# Patient Record
Sex: MALE | Race: WHITE | NOT HISPANIC OR LATINO | Employment: FULL TIME | URBAN - METROPOLITAN AREA
[De-identification: names, ages, dates, MRNs, and addresses within clinical notes are randomized per-mention and may not be internally consistent; named-entity substitution may affect disease eponyms.]

---

## 2021-06-08 ENCOUNTER — OFFICE VISIT (OUTPATIENT)
Dept: SURGERY | Facility: CLINIC | Age: 31
End: 2021-06-08

## 2021-06-08 VITALS
BODY MASS INDEX: 28.55 KG/M2 | SYSTOLIC BLOOD PRESSURE: 116 MMHG | WEIGHT: 181.9 LBS | TEMPERATURE: 98 F | OXYGEN SATURATION: 98 % | HEART RATE: 86 BPM | RESPIRATION RATE: 18 BRPM | DIASTOLIC BLOOD PRESSURE: 74 MMHG | HEIGHT: 67 IN

## 2021-06-08 DIAGNOSIS — R10.13 EPIGASTRIC PAIN: Primary | ICD-10-CM

## 2021-06-08 PROCEDURE — 99244 OFF/OP CNSLTJ NEW/EST MOD 40: CPT | Performed by: SURGERY

## 2021-06-08 RX ORDER — ESOMEPRAZOLE MAGNESIUM 40 MG/1
CAPSULE, DELAYED RELEASE ORAL
Status: ON HOLD | COMMUNITY
Start: 2021-05-23 | End: 2021-06-18

## 2021-06-08 RX ORDER — CLARITHROMYCIN 500 MG/1
TABLET, COATED ORAL
COMMUNITY
Start: 2021-05-29 | End: 2021-06-14

## 2021-06-08 RX ORDER — AMOXICILLIN 500 MG/1
CAPSULE ORAL
COMMUNITY
Start: 2021-05-29 | End: 2021-06-14

## 2021-06-08 RX ORDER — MELOXICAM 15 MG/1
15 TABLET ORAL
COMMUNITY
Start: 2021-02-17 | End: 2021-06-14

## 2021-06-08 NOTE — PROGRESS NOTES
CC:  Epigastric pain     Patient is a 31 y.o. male referred by Arnie Gutierrez MD for evaluation of epigastric abdominal pain.  Patient reports in the epigastric area of the abdomen he has having a severe burning sensation and gnawing that does not radiate.  Patient had a previous history in 2011 and hospitalization for bleeding ulcers in his stomach and esophagus.  Patient has been having some dark stools but no diarrhea.  Patient has been having bloating after eating fatty foods and milk products.  Patient has tried to eliminate those foods but has not seen any difference in his discomfort.  Patient was placed on Nexium and treated for H. pylori Ag approximately 1 month ago and has not had any relief.  He is started on align and a prebiotic.  His last EGD was in 2011 when he was hospitalized for bleeding ulcers.  Patient denies hematemesis, fevers, chills, nausea or vomiting.  Patient has not had any unexplained weight loss.    Past Medical History:   Diagnosis Date   • Ulcer of abdomen wall (CMS/HCC)    • Ulcer of esophagus      Past Surgical History:   Procedure Laterality Date   • ADENOIDECTOMY     • TONSILLECTOMY     • WISDOM TOOTH EXTRACTION       Family History   Problem Relation Age of Onset   • Melanoma Father    • No Known Problems Brother    • No Known Problems Daughter    • Asthma Son    • Heart disease Maternal Grandmother    • Hypertension Maternal Grandmother    • Breast cancer Paternal Grandmother      Social History     Tobacco Use   • Smoking status: Never Smoker   • Smokeless tobacco: Never Used   Substance Use Topics   • Alcohol use: Yes     Alcohol/week: 7.0 standard drinks     Types: 7 Cans of beer per week   • Drug use: Not Currently     No Known Allergies    Current Outpatient Medications:   •  amoxicillin (AMOXIL) 500 MG capsule, TAKE 1 CAPSULE BY MOUTH TWICE DAILY FOR 14 DAYS OR UNTIL DIRECTED TO STOP, Disp: , Rfl:   •  clarithromycin (BIAXIN) 500 MG tablet, TAKE 1 TABLET BY MOUTH TWICE  "DAILY FOR 14 DAYS OR UNTIL DIRECTED TO STOP, Disp: , Rfl:   •  esomeprazole (nexIUM) 40 MG capsule, , Disp: , Rfl:   •  meloxicam (MOBIC) 15 MG tablet, Take 15 mg by mouth., Disp: , Rfl:   •  NON FORMULARY, ALIGN DUALBIOTIC PREBIOTIC + PROBIOTIC, Disp: , Rfl:     Review of Systems  General: Patient reports during good health  Eyes: No eye problems  Ears, nose, mouth and throat: No rhinitis, no hearing problems, no chronic cough  Cardiovascular/heart: Denies palpitations, syncope or chest pain  Respiratory/lung: Denies shortness of breath, hemoptysis, dyspnea on exertion   Genital/urinary: No frequency, hematuria or dysuria  Hematological/lymphatic: Denies anemia or other problems  Musculoskeletal: No joint pain, no defects  Skin: No psoriasis or other skin issues  Neurological: No seizures or other neurological problems  Psychiatric: None  Endocrine: Negative  Gastro-intestinal: No constipation, no diarrhea, no melena, no hematochezia, see HPI  Vitals:    06/08/21 0820   BP: 116/74   BP Location: Left arm   Patient Position: Sitting   Cuff Size: Adult   Pulse: 86   Resp: 18   Temp: 98 °F (36.7 °C)   TempSrc: Temporal   SpO2: 98%   Weight: 82.5 kg (181 lb 14.4 oz)   Height: 170.2 cm (67\")       Physical Exam  General/physcological:   Alert and oriented x3, in no acute distress  HEENT: Normal cephalic, atraumatic, PERRLA, EOMI, sclera anicteric, moist mucous membranes, neck is supple, no JVD, no carotid bruits, no thyromegaly no adenopathy  Respiratory: CTA and percussion  CVA: RRR, normal S1-S2, no murmurs, no gallops or rubs  GI: Positive BS, soft, nondistended, nontender, no rebound, no guarding, no hernias, no organomegaly and no masses  Musculoskeletal: Moves all 4 ext, no clubbing, no cyanosis or edema  Neurovascular: Grossly intact  Debilities: none  Emotional behavior: appropriate     Patient does not use tobacco products currently.     Assessment:  Epigastric abdominal pain  Personal history of gastric " ulcers  Currently being treated for H pylori    Plan:  I have recommended that the patient undergo further evaluation with an EGD and biopsies.  I have discussed this procedure in detail with the patient.  I have discussed the risks, benefits and alternatives.  I have discussed the risk of anesthesia, bleeding and perforation.  Patient understands these risks, benefits and alternatives and wishes to proceed.  I have him scheduled at his earliest convenience.    Brittanie Sequeira MD  General, Minimally Invasive and Endoscopic Surgery  [facility]      [address] [address]    [address]  [address] [address]    P: [phone]  F: [phone]    Cc:  Arnie Gutierrez MD

## 2021-06-09 ENCOUNTER — TRANSCRIBE ORDERS (OUTPATIENT)
Dept: LAB | Facility: SURGERY CENTER | Age: 31
End: 2021-06-09

## 2021-06-09 DIAGNOSIS — Z01.818 OTHER SPECIFIED PRE-OPERATIVE EXAMINATION: Primary | ICD-10-CM

## 2021-06-09 PROBLEM — R10.13 EPIGASTRIC PAIN: Status: ACTIVE | Noted: 2021-06-09

## 2021-06-16 ENCOUNTER — LAB (OUTPATIENT)
Dept: LAB | Facility: SURGERY CENTER | Age: 31
End: 2021-06-16

## 2021-06-16 DIAGNOSIS — Z01.818 OTHER SPECIFIED PRE-OPERATIVE EXAMINATION: ICD-10-CM

## 2021-06-16 LAB — SARS-COV-2 ORF1AB RESP QL NAA+PROBE: NOT DETECTED

## 2021-06-16 PROCEDURE — C9803 HOPD COVID-19 SPEC COLLECT: HCPCS

## 2021-06-16 PROCEDURE — U0004 COV-19 TEST NON-CDC HGH THRU: HCPCS | Performed by: SURGERY

## 2021-06-18 ENCOUNTER — ANESTHESIA EVENT (OUTPATIENT)
Dept: SURGERY | Facility: SURGERY CENTER | Age: 31
End: 2021-06-18

## 2021-06-18 ENCOUNTER — HOSPITAL ENCOUNTER (OUTPATIENT)
Facility: SURGERY CENTER | Age: 31
Setting detail: HOSPITAL OUTPATIENT SURGERY
Discharge: HOME | End: 2021-06-18
Attending: SURGERY | Admitting: SURGERY

## 2021-06-18 ENCOUNTER — ANESTHESIA (OUTPATIENT)
Dept: SURGERY | Facility: SURGERY CENTER | Age: 31
End: 2021-06-18

## 2021-06-18 VITALS
SYSTOLIC BLOOD PRESSURE: 112 MMHG | OXYGEN SATURATION: 99 % | HEART RATE: 62 BPM | DIASTOLIC BLOOD PRESSURE: 75 MMHG | TEMPERATURE: 98 F | RESPIRATION RATE: 15 BRPM | BODY MASS INDEX: 27.1 KG/M2 | WEIGHT: 173 LBS

## 2021-06-18 DIAGNOSIS — R10.13 EPIGASTRIC PAIN: ICD-10-CM

## 2021-06-18 PROCEDURE — 88342 IMHCHEM/IMCYTCHM 1ST ANTB: CPT | Performed by: SURGERY

## 2021-06-18 PROCEDURE — 43239 EGD BIOPSY SINGLE/MULTIPLE: CPT | Performed by: SURGERY

## 2021-06-18 PROCEDURE — 25010000002 PROPOFOL 10 MG/ML EMULSION: Performed by: ANESTHESIOLOGY

## 2021-06-18 PROCEDURE — 25010000002 FENTANYL CITRATE (PF) 50 MCG/ML SOLUTION: Performed by: ANESTHESIOLOGY

## 2021-06-18 PROCEDURE — 88305 TISSUE EXAM BY PATHOLOGIST: CPT | Performed by: SURGERY

## 2021-06-18 RX ORDER — LIDOCAINE HYDROCHLORIDE 10 MG/ML
0.5 INJECTION, SOLUTION INFILTRATION; PERINEURAL ONCE AS NEEDED
Status: DISCONTINUED | OUTPATIENT
Start: 2021-06-18 | End: 2021-06-18 | Stop reason: HOSPADM

## 2021-06-18 RX ORDER — FENTANYL CITRATE 50 UG/ML
INJECTION, SOLUTION INTRAMUSCULAR; INTRAVENOUS AS NEEDED
Status: DISCONTINUED | OUTPATIENT
Start: 2021-06-18 | End: 2021-06-18 | Stop reason: SURG

## 2021-06-18 RX ORDER — LIDOCAINE HYDROCHLORIDE 20 MG/ML
INJECTION, SOLUTION INFILTRATION; PERINEURAL AS NEEDED
Status: DISCONTINUED | OUTPATIENT
Start: 2021-06-18 | End: 2021-06-18 | Stop reason: SURG

## 2021-06-18 RX ORDER — SODIUM CHLORIDE, SODIUM LACTATE, POTASSIUM CHLORIDE, CALCIUM CHLORIDE 600; 310; 30; 20 MG/100ML; MG/100ML; MG/100ML; MG/100ML
1000 INJECTION, SOLUTION INTRAVENOUS CONTINUOUS
Status: DISCONTINUED | OUTPATIENT
Start: 2021-06-18 | End: 2021-06-18 | Stop reason: HOSPADM

## 2021-06-18 RX ORDER — OMEPRAZOLE 20 MG/1
20 CAPSULE, DELAYED RELEASE ORAL DAILY
COMMUNITY
End: 2021-08-05

## 2021-06-18 RX ORDER — PROPOFOL 10 MG/ML
VIAL (ML) INTRAVENOUS AS NEEDED
Status: DISCONTINUED | OUTPATIENT
Start: 2021-06-18 | End: 2021-06-18 | Stop reason: SURG

## 2021-06-18 RX ORDER — PROPOFOL 10 MG/ML
VIAL (ML) INTRAVENOUS CONTINUOUS PRN
Status: DISCONTINUED | OUTPATIENT
Start: 2021-06-18 | End: 2021-06-18 | Stop reason: SURG

## 2021-06-18 RX ORDER — MAGNESIUM HYDROXIDE 1200 MG/15ML
LIQUID ORAL AS NEEDED
Status: DISCONTINUED | OUTPATIENT
Start: 2021-06-18 | End: 2021-06-18 | Stop reason: HOSPADM

## 2021-06-18 RX ORDER — SODIUM CHLORIDE 0.9 % (FLUSH) 0.9 %
10 SYRINGE (ML) INJECTION AS NEEDED
Status: DISCONTINUED | OUTPATIENT
Start: 2021-06-18 | End: 2021-06-18 | Stop reason: HOSPADM

## 2021-06-18 RX ORDER — GLYCOPYRROLATE 0.2 MG/ML
INJECTION INTRAMUSCULAR; INTRAVENOUS AS NEEDED
Status: DISCONTINUED | OUTPATIENT
Start: 2021-06-18 | End: 2021-06-18 | Stop reason: SURG

## 2021-06-18 RX ADMIN — FENTANYL CITRATE 25 MCG: 50 INJECTION, SOLUTION INTRAMUSCULAR; INTRAVENOUS at 07:56

## 2021-06-18 RX ADMIN — FENTANYL CITRATE 25 MCG: 50 INJECTION, SOLUTION INTRAMUSCULAR; INTRAVENOUS at 07:52

## 2021-06-18 RX ADMIN — PROPOFOL 200 MCG/KG/MIN: 10 INJECTION, EMULSION INTRAVENOUS at 07:50

## 2021-06-18 RX ADMIN — PROPOFOL 150 MG: 10 INJECTION, EMULSION INTRAVENOUS at 07:50

## 2021-06-18 RX ADMIN — LIDOCAINE HYDROCHLORIDE 60 MG: 20 INJECTION, SOLUTION INFILTRATION; PERINEURAL at 07:50

## 2021-06-18 RX ADMIN — FENTANYL CITRATE 25 MCG: 50 INJECTION, SOLUTION INTRAMUSCULAR; INTRAVENOUS at 07:50

## 2021-06-18 RX ADMIN — GLYCOPYRROLATE 0.1 MG: 0.2 INJECTION, SOLUTION INTRAMUSCULAR; INTRAVENOUS at 07:47

## 2021-06-18 RX ADMIN — SODIUM CHLORIDE, POTASSIUM CHLORIDE, SODIUM LACTATE AND CALCIUM CHLORIDE 1000 ML: 600; 310; 30; 20 INJECTION, SOLUTION INTRAVENOUS at 06:39

## 2021-06-18 RX ADMIN — FENTANYL CITRATE 25 MCG: 50 INJECTION, SOLUTION INTRAMUSCULAR; INTRAVENOUS at 07:54

## 2021-06-18 NOTE — ANESTHESIA PREPROCEDURE EVALUATION
Anesthesia Evaluation     Patient summary reviewed and Nursing notes reviewed   NPO Solid Status: > 8 hours  NPO Liquid Status: > 2 hours           Airway   Mallampati: II  TM distance: >3 FB  Neck ROM: full  Dental - normal exam     Pulmonary - negative pulmonary ROS and normal exam    breath sounds clear to auscultation  Cardiovascular - negative cardio ROS and normal exam    Rhythm: regular  Rate: normal    (-) angina, orthopnea, PND, BARKER      Neuro/Psych- negative ROS  GI/Hepatic/Renal/Endo    (+)  PUD,      Musculoskeletal (-) negative ROS    Abdominal    Substance History - negative use     OB/GYN negative ob/gyn ROS         Other - negative ROS                       Anesthesia Plan    ASA 2     MAC       Anesthetic plan, all risks, benefits, and alternatives have been provided, discussed and informed consent has been obtained with: patient.

## 2021-06-18 NOTE — ANESTHESIA POSTPROCEDURE EVALUATION
Patient: Shalom Luna    Procedure Summary     Date: 06/18/21 Room / Location[facility]    Anesthesia Start: 0737 Anesthesia Stop: 0811    Procedure: ESOPHAGOGASTRODUODENOSCOPY (N/A ) Diagnosis:       Epigastric pain      (Epigastric pain [R10.13])    Surgeons: Brittanie Sequeira MD Provider: Eddie Avila MD    Anesthesia Type: MAC ASA Status: 2          Anesthesia Type: MAC    Vitals  Vitals Value Taken Time   /70 06/18/21 0811   Temp 36.7 °C (98 °F) 06/18/21 0810   Pulse 72 06/18/21 0811   Resp 15 06/18/21 0810   SpO2 96 % 06/18/21 0811   Vitals shown include unvalidated device data.        Post Anesthesia Care and Evaluation    Patient location during evaluation: PHASE II  Patient participation: waiting for patient participation  Level of consciousness: sleepy but conscious  Pain management: adequate  Airway patency: patent    Cardiovascular status: acceptable  Respiratory status: acceptable  Hydration status: acceptable

## 2021-06-22 LAB
LAB AP CASE REPORT: NORMAL
PATH REPORT.FINAL DX SPEC: NORMAL
PATH REPORT.GROSS SPEC: NORMAL

## 2021-06-25 DIAGNOSIS — K80.50 BILIARY COLIC SYMPTOM: Primary | ICD-10-CM

## 2021-07-15 ENCOUNTER — HOSPITAL ENCOUNTER (OUTPATIENT)
Dept: ULTRASOUND IMAGING | Facility: HOSPITAL | Age: 31
Discharge: HOME | End: 2021-07-15
Admitting: SURGERY

## 2021-07-15 ENCOUNTER — HOSPITAL ENCOUNTER (OUTPATIENT)
Dept: NUCLEAR MEDICINE | Facility: HOSPITAL | Age: 31
Discharge: HOME | End: 2021-07-15

## 2021-07-15 DIAGNOSIS — K80.50 BILIARY COLIC SYMPTOM: ICD-10-CM

## 2021-07-15 PROCEDURE — 76705 ECHO EXAM OF ABDOMEN: CPT

## 2021-07-21 DIAGNOSIS — K80.00 CALCULUS OF GALLBLADDER WITH ACUTE CHOLECYSTITIS WITHOUT OBSTRUCTION: Primary | ICD-10-CM

## 2021-07-22 ENCOUNTER — TRANSCRIBE ORDERS (OUTPATIENT)
Dept: SURGERY | Facility: SURGERY CENTER | Age: 31
End: 2021-07-22

## 2021-07-22 ENCOUNTER — TRANSCRIBE ORDERS (OUTPATIENT)
Dept: LAB | Facility: SURGERY CENTER | Age: 31
End: 2021-07-22

## 2021-07-22 DIAGNOSIS — K80.00 CALCULUS OF GALLBLADDER WITH ACUTE CHOLECYSTITIS WITHOUT OBSTRUCTION: Primary | ICD-10-CM

## 2021-07-22 DIAGNOSIS — Z01.818 OTHER SPECIFIED PRE-OPERATIVE EXAMINATION: Primary | ICD-10-CM

## 2021-07-28 ENCOUNTER — LAB (OUTPATIENT)
Dept: LAB | Facility: SURGERY CENTER | Age: 31
End: 2021-07-28

## 2021-07-28 DIAGNOSIS — Z01.818 OTHER SPECIFIED PRE-OPERATIVE EXAMINATION: ICD-10-CM

## 2021-07-28 LAB — SARS-COV-2 ORF1AB RESP QL NAA+PROBE: NOT DETECTED

## 2021-07-28 PROCEDURE — U0004 COV-19 TEST NON-CDC HGH THRU: HCPCS | Performed by: SURGERY

## 2021-07-28 PROCEDURE — C9803 HOPD COVID-19 SPEC COLLECT: HCPCS

## 2021-07-30 ENCOUNTER — ANESTHESIA EVENT (OUTPATIENT)
Dept: SURGERY | Facility: SURGERY CENTER | Age: 31
End: 2021-07-30

## 2021-07-30 ENCOUNTER — ANESTHESIA (OUTPATIENT)
Dept: SURGERY | Facility: SURGERY CENTER | Age: 31
End: 2021-07-30

## 2021-07-30 ENCOUNTER — HOSPITAL ENCOUNTER (OUTPATIENT)
Facility: SURGERY CENTER | Age: 31
Setting detail: HOSPITAL OUTPATIENT SURGERY
Discharge: HOME | End: 2021-07-30
Attending: SURGERY | Admitting: SURGERY

## 2021-07-30 ENCOUNTER — HOSPITAL ENCOUNTER (OUTPATIENT)
Dept: GENERAL RADIOLOGY | Facility: SURGERY CENTER | Age: 31
Setting detail: HOSPITAL OUTPATIENT SURGERY
End: 2021-07-30

## 2021-07-30 VITALS
OXYGEN SATURATION: 96 % | BODY MASS INDEX: 25.37 KG/M2 | TEMPERATURE: 97.3 F | RESPIRATION RATE: 16 BRPM | HEIGHT: 68 IN | SYSTOLIC BLOOD PRESSURE: 136 MMHG | HEART RATE: 79 BPM | WEIGHT: 167.4 LBS | DIASTOLIC BLOOD PRESSURE: 111 MMHG

## 2021-07-30 DIAGNOSIS — K80.00 CALCULUS OF GALLBLADDER WITH ACUTE CHOLECYSTITIS WITHOUT OBSTRUCTION: ICD-10-CM

## 2021-07-30 PROCEDURE — 25010000003 MEPERIDINE PER 100 MG: Performed by: SURGERY

## 2021-07-30 PROCEDURE — 25010000002 DEXAMETHASONE PER 1 MG: Performed by: NURSE ANESTHETIST, CERTIFIED REGISTERED

## 2021-07-30 PROCEDURE — 74300 X-RAY BILE DUCTS/PANCREAS: CPT

## 2021-07-30 PROCEDURE — OUTSIDEPOS PR OUTSIDE POS PLACEHOLDER: Performed by: SURGERY

## 2021-07-30 PROCEDURE — 25010000002 FENTANYL CITRATE (PF) 250 MCG/5ML SOLUTION: Performed by: NURSE ANESTHETIST, CERTIFIED REGISTERED

## 2021-07-30 PROCEDURE — 88304 TISSUE EXAM BY PATHOLOGIST: CPT | Performed by: SURGERY

## 2021-07-30 PROCEDURE — 25010000003 CEFAZOLIN SODIUM-DEXTROSE 2-3 GM-%(50ML) RECONSTITUTED SOLUTION: Performed by: SURGERY

## 2021-07-30 PROCEDURE — S0260 H&P FOR SURGERY: HCPCS | Performed by: SURGERY

## 2021-07-30 PROCEDURE — C1889 IMPLANT/INSERT DEVICE, NOC: HCPCS | Performed by: SURGERY

## 2021-07-30 PROCEDURE — 47563 LAPARO CHOLECYSTECTOMY/GRAPH: CPT | Performed by: SURGERY

## 2021-07-30 PROCEDURE — 0FT44ZZ RESECTION OF GALLBLADDER, PERCUTANEOUS ENDOSCOPIC APPROACH: ICD-10-PCS | Performed by: SURGERY

## 2021-07-30 PROCEDURE — 25010000002 ONDANSETRON PER 1 MG: Performed by: NURSE ANESTHETIST, CERTIFIED REGISTERED

## 2021-07-30 PROCEDURE — 25010000002 NEOSTIGMINE 5 MG/10ML SOLUTION: Performed by: NURSE ANESTHETIST, CERTIFIED REGISTERED

## 2021-07-30 PROCEDURE — 25010000002 MIDAZOLAM PER 1MG: Performed by: ANESTHESIOLOGY

## 2021-07-30 PROCEDURE — 25010000002 PROPOFOL 10 MG/ML EMULSION: Performed by: NURSE ANESTHETIST, CERTIFIED REGISTERED

## 2021-07-30 PROCEDURE — BF000ZZ PLAIN RADIOGRAPHY OF BILE DUCTS USING HIGH OSMOLAR CONTRAST: ICD-10-PCS | Performed by: SURGERY

## 2021-07-30 PROCEDURE — 25010000002 KETOROLAC TROMETHAMINE PER 15 MG: Performed by: NURSE ANESTHETIST, CERTIFIED REGISTERED

## 2021-07-30 PROCEDURE — 0 IOPAMIDOL 41 % SOLUTION 20 ML VIAL: Performed by: SURGERY

## 2021-07-30 DEVICE — CLIP LIGAT VASC HORIZON TI MD/LG GRN 6CT: Type: IMPLANTABLE DEVICE | Site: ABDOMEN | Status: FUNCTIONAL

## 2021-07-30 RX ORDER — OXYCODONE HYDROCHLORIDE AND ACETAMINOPHEN 5; 325 MG/1; MG/1
1 TABLET ORAL EVERY 4 HOURS PRN
Qty: 30 TABLET | Refills: 0 | Status: SHIPPED | OUTPATIENT
Start: 2021-07-30 | End: 2021-08-04

## 2021-07-30 RX ORDER — IBUPROFEN 200 MG
600 TABLET ORAL ONCE AS NEEDED
Status: DISCONTINUED | OUTPATIENT
Start: 2021-07-30 | End: 2021-07-30 | Stop reason: HOSPADM

## 2021-07-30 RX ORDER — SODIUM CHLORIDE, SODIUM LACTATE, POTASSIUM CHLORIDE, CALCIUM CHLORIDE 600; 310; 30; 20 MG/100ML; MG/100ML; MG/100ML; MG/100ML
9 INJECTION, SOLUTION INTRAVENOUS CONTINUOUS PRN
Status: DISCONTINUED | OUTPATIENT
Start: 2021-07-30 | End: 2021-07-30 | Stop reason: HOSPADM

## 2021-07-30 RX ORDER — ROCURONIUM BROMIDE 10 MG/ML
INJECTION, SOLUTION INTRAVENOUS AS NEEDED
Status: DISCONTINUED | OUTPATIENT
Start: 2021-07-30 | End: 2021-07-30 | Stop reason: SURG

## 2021-07-30 RX ORDER — MAGNESIUM HYDROXIDE 1200 MG/15ML
LIQUID ORAL AS NEEDED
Status: DISCONTINUED | OUTPATIENT
Start: 2021-07-30 | End: 2021-07-30 | Stop reason: HOSPADM

## 2021-07-30 RX ORDER — MIDAZOLAM HYDROCHLORIDE 1 MG/ML
1 INJECTION INTRAMUSCULAR; INTRAVENOUS
Status: DISCONTINUED | OUTPATIENT
Start: 2021-07-30 | End: 2021-07-30 | Stop reason: HOSPADM

## 2021-07-30 RX ORDER — FENTANYL CITRATE 50 UG/ML
50 INJECTION, SOLUTION INTRAMUSCULAR; INTRAVENOUS
Status: DISCONTINUED | OUTPATIENT
Start: 2021-07-30 | End: 2021-07-30 | Stop reason: HOSPADM

## 2021-07-30 RX ORDER — NALOXONE HCL 0.4 MG/ML
0.2 VIAL (ML) INJECTION AS NEEDED
Status: DISCONTINUED | OUTPATIENT
Start: 2021-07-30 | End: 2021-07-30 | Stop reason: HOSPADM

## 2021-07-30 RX ORDER — ONDANSETRON 4 MG/1
4 TABLET, FILM COATED ORAL EVERY 4 HOURS PRN
Qty: 15 TABLET | Refills: 1 | Status: SHIPPED | OUTPATIENT
Start: 2021-07-30 | End: 2021-08-10

## 2021-07-30 RX ORDER — ONDANSETRON 2 MG/ML
INJECTION INTRAMUSCULAR; INTRAVENOUS AS NEEDED
Status: DISCONTINUED | OUTPATIENT
Start: 2021-07-30 | End: 2021-07-30 | Stop reason: SURG

## 2021-07-30 RX ORDER — NEOSTIGMINE METHYLSULFATE 0.5 MG/ML
INJECTION, SOLUTION INTRAVENOUS AS NEEDED
Status: DISCONTINUED | OUTPATIENT
Start: 2021-07-30 | End: 2021-07-30 | Stop reason: SURG

## 2021-07-30 RX ORDER — LIDOCAINE HYDROCHLORIDE 10 MG/ML
0.5 INJECTION, SOLUTION INFILTRATION; PERINEURAL ONCE AS NEEDED
Status: DISCONTINUED | OUTPATIENT
Start: 2021-07-30 | End: 2021-07-30 | Stop reason: HOSPADM

## 2021-07-30 RX ORDER — PROPOFOL 10 MG/ML
VIAL (ML) INTRAVENOUS AS NEEDED
Status: DISCONTINUED | OUTPATIENT
Start: 2021-07-30 | End: 2021-07-30 | Stop reason: SURG

## 2021-07-30 RX ORDER — GLYCOPYRROLATE 0.2 MG/ML
INJECTION INTRAMUSCULAR; INTRAVENOUS AS NEEDED
Status: DISCONTINUED | OUTPATIENT
Start: 2021-07-30 | End: 2021-07-30 | Stop reason: SURG

## 2021-07-30 RX ORDER — SODIUM CHLORIDE 0.9 % (FLUSH) 0.9 %
10 SYRINGE (ML) INJECTION AS NEEDED
Status: DISCONTINUED | OUTPATIENT
Start: 2021-07-30 | End: 2021-07-30 | Stop reason: HOSPADM

## 2021-07-30 RX ORDER — MEPERIDINE HYDROCHLORIDE 25 MG/ML
12.5 INJECTION INTRAMUSCULAR; INTRAVENOUS; SUBCUTANEOUS EVERY 6 HOURS PRN
Status: DISCONTINUED | OUTPATIENT
Start: 2021-07-30 | End: 2021-07-30 | Stop reason: HOSPADM

## 2021-07-30 RX ORDER — SCOLOPAMINE TRANSDERMAL SYSTEM 1 MG/1
1 PATCH, EXTENDED RELEASE TRANSDERMAL CONTINUOUS
Status: DISCONTINUED | OUTPATIENT
Start: 2021-07-30 | End: 2021-07-30 | Stop reason: HOSPADM

## 2021-07-30 RX ORDER — BUPIVACAINE HYDROCHLORIDE AND EPINEPHRINE 2.5; 5 MG/ML; UG/ML
INJECTION, SOLUTION EPIDURAL; INFILTRATION; INTRACAUDAL; PERINEURAL AS NEEDED
Status: DISCONTINUED | OUTPATIENT
Start: 2021-07-30 | End: 2021-07-30 | Stop reason: HOSPADM

## 2021-07-30 RX ORDER — FENTANYL CITRATE 50 UG/ML
INJECTION, SOLUTION INTRAMUSCULAR; INTRAVENOUS AS NEEDED
Status: DISCONTINUED | OUTPATIENT
Start: 2021-07-30 | End: 2021-07-30 | Stop reason: SURG

## 2021-07-30 RX ORDER — FLUMAZENIL 0.1 MG/ML
0.2 INJECTION INTRAVENOUS AS NEEDED
Status: DISCONTINUED | OUTPATIENT
Start: 2021-07-30 | End: 2021-07-30 | Stop reason: HOSPADM

## 2021-07-30 RX ORDER — SODIUM CHLORIDE, SODIUM LACTATE, POTASSIUM CHLORIDE, CALCIUM CHLORIDE 600; 310; 30; 20 MG/100ML; MG/100ML; MG/100ML; MG/100ML
1000 INJECTION, SOLUTION INTRAVENOUS CONTINUOUS
Status: DISCONTINUED | OUTPATIENT
Start: 2021-07-30 | End: 2021-07-30 | Stop reason: HOSPADM

## 2021-07-30 RX ORDER — KETOROLAC TROMETHAMINE 30 MG/ML
INJECTION, SOLUTION INTRAMUSCULAR; INTRAVENOUS AS NEEDED
Status: DISCONTINUED | OUTPATIENT
Start: 2021-07-30 | End: 2021-07-30 | Stop reason: SURG

## 2021-07-30 RX ORDER — DIPHENHYDRAMINE HYDROCHLORIDE 50 MG/ML
12.5 INJECTION INTRAMUSCULAR; INTRAVENOUS
Status: DISCONTINUED | OUTPATIENT
Start: 2021-07-30 | End: 2021-07-30 | Stop reason: HOSPADM

## 2021-07-30 RX ORDER — LIDOCAINE HYDROCHLORIDE 20 MG/ML
INJECTION, SOLUTION INFILTRATION; PERINEURAL AS NEEDED
Status: DISCONTINUED | OUTPATIENT
Start: 2021-07-30 | End: 2021-07-30 | Stop reason: SURG

## 2021-07-30 RX ORDER — DIPHENHYDRAMINE HCL 25 MG
25 TABLET ORAL
Status: DISCONTINUED | OUTPATIENT
Start: 2021-07-30 | End: 2021-07-30 | Stop reason: HOSPADM

## 2021-07-30 RX ORDER — CEFAZOLIN SODIUM 2 G/50ML
2 SOLUTION INTRAVENOUS ONCE
Status: COMPLETED | OUTPATIENT
Start: 2021-07-30 | End: 2021-07-30

## 2021-07-30 RX ORDER — DEXAMETHASONE SODIUM PHOSPHATE 4 MG/ML
INJECTION, SOLUTION INTRA-ARTICULAR; INTRALESIONAL; INTRAMUSCULAR; INTRAVENOUS; SOFT TISSUE AS NEEDED
Status: DISCONTINUED | OUTPATIENT
Start: 2021-07-30 | End: 2021-07-30 | Stop reason: SURG

## 2021-07-30 RX ADMIN — DEXAMETHASONE SODIUM PHOSPHATE 8 MG: 4 INJECTION, SOLUTION INTRAMUSCULAR; INTRAVENOUS at 07:10

## 2021-07-30 RX ADMIN — CEFAZOLIN SODIUM 2 G: 2 SOLUTION INTRAVENOUS at 06:58

## 2021-07-30 RX ADMIN — FENTANYL CITRATE 50 MCG: 50 INJECTION, SOLUTION INTRAMUSCULAR; INTRAVENOUS at 08:02

## 2021-07-30 RX ADMIN — SCOPALAMINE 1 PATCH: 1 PATCH, EXTENDED RELEASE TRANSDERMAL at 06:54

## 2021-07-30 RX ADMIN — FENTANYL CITRATE 100 MCG: 50 INJECTION, SOLUTION INTRAMUSCULAR; INTRAVENOUS at 07:00

## 2021-07-30 RX ADMIN — KETOROLAC TROMETHAMINE 30 MG: 30 INJECTION, SOLUTION INTRAMUSCULAR at 07:49

## 2021-07-30 RX ADMIN — LIDOCAINE HYDROCHLORIDE 100 MG: 20 INJECTION, SOLUTION INFILTRATION; PERINEURAL at 07:03

## 2021-07-30 RX ADMIN — MEPERIDINE HYDROCHLORIDE 12.5 MG: 25 INJECTION, SOLUTION INTRAMUSCULAR; INTRAVENOUS; SUBCUTANEOUS at 08:25

## 2021-07-30 RX ADMIN — FAMOTIDINE 20 MG: 10 INJECTION INTRAVENOUS at 06:55

## 2021-07-30 RX ADMIN — FENTANYL CITRATE 50 MCG: 50 INJECTION, SOLUTION INTRAMUSCULAR; INTRAVENOUS at 07:50

## 2021-07-30 RX ADMIN — GLYCOPYRROLATE 0.4 MG: 0.2 INJECTION, SOLUTION INTRAMUSCULAR; INTRAVENOUS at 07:49

## 2021-07-30 RX ADMIN — PROPOFOL 200 MG: 10 INJECTION, EMULSION INTRAVENOUS at 07:03

## 2021-07-30 RX ADMIN — ONDANSETRON 4 MG: 2 INJECTION INTRAMUSCULAR; INTRAVENOUS at 07:49

## 2021-07-30 RX ADMIN — ROCURONIUM BROMIDE 40 MG: 10 INJECTION, SOLUTION INTRAVENOUS at 07:03

## 2021-07-30 RX ADMIN — SODIUM CHLORIDE, POTASSIUM CHLORIDE, SODIUM LACTATE AND CALCIUM CHLORIDE 1000 ML: 600; 310; 30; 20 INJECTION, SOLUTION INTRAVENOUS at 06:27

## 2021-07-30 RX ADMIN — MIDAZOLAM HYDROCHLORIDE 1 MG: 2 INJECTION, SOLUTION INTRAMUSCULAR; INTRAVENOUS at 06:55

## 2021-07-30 RX ADMIN — NEOSTIGMINE METHYLSULFATE 2 MG: 0.5 INJECTION, SOLUTION INTRAVENOUS at 07:49

## 2021-07-30 NOTE — ANESTHESIA PREPROCEDURE EVALUATION
Anesthesia Evaluation     Patient summary reviewed and Nursing notes reviewed   NPO Solid Status: > 8 hours  NPO Liquid Status: > 2 hours           Airway   Mallampati: II  Dental - normal exam     Pulmonary - negative pulmonary ROS and normal exam   Cardiovascular - negative cardio ROS and normal exam        Neuro/Psych- negative ROS  GI/Hepatic/Renal/Endo    (+)  PUD,      Musculoskeletal (-) negative ROS    Abdominal    Substance History - negative use     OB/GYN          Other - negative ROS                       Anesthesia Plan    ASA 1     general       Anesthetic plan, all risks, benefits, and alternatives have been provided, discussed and informed consent has been obtained with: patient.

## 2021-07-30 NOTE — ANESTHESIA POSTPROCEDURE EVALUATION
"Patient: Shalom Luna    Procedure Summary     Date: 07/30/21 Room / Location: [facility]    Anesthesia Start: 0658 Anesthesia Stop: 0811    Procedure: CHOLECYSTECTOMY LAPAROSCOPIC INTRAOPERATIVE CHOLANGIOGRAM (N/A Abdomen) Diagnosis:       Calculus of gallbladder with acute cholecystitis without obstruction      (Calculus of gallbladder with acute cholecystitis without obstruction [K80.00])    Surgeons: Brittanie Sequeira MD Provider: Sarahi Davis MD    Anesthesia Type: general ASA Status: 1          Anesthesia Type: general    Vitals  Vitals Value Taken Time   /76 07/30/21 0835   Temp 36.3 °C (97.3 °F) 07/30/21 0810   Pulse 65 07/30/21 0835   Resp 16 07/30/21 0825   SpO2 94 % 07/30/21 0835   Vitals shown include unvalidated device data.        Post Anesthesia Care and Evaluation    Patient location during evaluation: PHASE II  Patient participation: complete - patient participated  Level of consciousness: awake  Pain management: adequate  Airway patency: patent  Anesthetic complications: No anesthetic complications    Cardiovascular status: acceptable  Respiratory status: acceptable  Hydration status: acceptable    Comments: /78   Pulse 75   Temp 36.3 °C (97.3 °F) (Infrared)   Resp 16   Ht 172.7 cm (68\")   Wt 75.9 kg (167 lb 6.4 oz)   SpO2 96%   BMI 25.45 kg/m²       "

## 2021-07-30 NOTE — ANESTHESIA PROCEDURE NOTES
Airway  Urgency: elective    Date/Time: 7/30/2021 7:06 AM  Airway not difficult    General Information and Staff    Patient location during procedure: OR  Anesthesiologist: Sarahi Davis MD  CRNA: Ben Valdovinos CRNA    Indications and Patient Condition  Indications for airway management: airway protection    Preoxygenated: yes  MILS maintained throughout  Mask difficulty assessment: 1 - vent by mask    Final Airway Details  Final airway type: endotracheal airway      Successful airway: ETT  Cuffed: yes   Successful intubation technique: direct laryngoscopy  Facilitating devices/methods: intubating stylet  Endotracheal tube insertion site: oral  Blade: Whitney  Blade size: 2  ETT size (mm): 7.5  Cormack-Lehane Classification: grade I - full view of glottis  Placement verified by: chest auscultation and capnometry   Cuff volume (mL): 6  Measured from: lips  ETT/EBT  to lips (cm): 21  Number of attempts at approach: 1  Assessment: lips, teeth, and gum same as pre-op and atraumatic intubation

## 2021-08-02 LAB
CYTO UR: NORMAL
LAB AP CASE REPORT: NORMAL
LAB AP CLINICAL INFORMATION: NORMAL
PATH REPORT.FINAL DX SPEC: NORMAL
PATH REPORT.GROSS SPEC: NORMAL

## 2021-08-10 ENCOUNTER — OFFICE VISIT (OUTPATIENT)
Dept: SURGERY | Facility: CLINIC | Age: 31
End: 2021-08-10

## 2021-08-10 VITALS
DIASTOLIC BLOOD PRESSURE: 76 MMHG | HEIGHT: 68 IN | SYSTOLIC BLOOD PRESSURE: 114 MMHG | OXYGEN SATURATION: 96 % | HEART RATE: 82 BPM | TEMPERATURE: 97.4 F | RESPIRATION RATE: 18 BRPM | BODY MASS INDEX: 26.18 KG/M2 | WEIGHT: 172.7 LBS

## 2021-08-10 DIAGNOSIS — Z09 FOLLOW UP: Primary | ICD-10-CM

## 2021-08-10 PROCEDURE — 99024 POSTOP FOLLOW-UP VISIT: CPT | Performed by: SURGERY

## 2021-08-10 NOTE — PROGRESS NOTES
"Answers for HPI/ROS submitted by the patient on 8/10/2021  What is the primary reason for your visit?: Other  Please describe your symptoms.: No symptoms.  Have you had these symptoms before?: No  How long have you been having these symptoms?: 1-4 days    Chief complaint:    Chief Complaint   Patient presents with   • Post-op     cholecystecomy laparoscopic        History of Present Illness    This is Shalom Luna 31 y.o. status post laparoscopic cholecystectomy and is doing very well.  Patient denies fever, chills, nausea or vomiting.  Patient's pain is well-controlled.      The following portions of the patient's history were reviewed and updated as appropriate: allergies, current medications, past family history, past medical history, past social history, past surgical history and problem list.    Vitals:    08/10/21 1513   BP: 114/76   BP Location: Left arm   Patient Position: Sitting   Cuff Size: Adult   Pulse: 82   Resp: 18   Temp: 97.4 °F (36.3 °C)   TempSrc: Temporal   SpO2: 96%   Weight: 78.3 kg (172 lb 11.2 oz)   Height: 172.7 cm (67.99\")       Physical Exam  Gen.: Patient is alert and oriented ×3, no acute distress  HEENT: Normal cephalic, atraumatic, moist mucous membranes, anicteric  Incision is well-healed without evidence of infection, herniation or seroma.    Assessment/plan:    S/P laparoscopic cholecystectomy  I have instructed the patient not lift greater than 10 pounds for total of 6 weeks from the time of surgery.   I have instructed the patient follow-up as needed.    Brittanie Sequeira MD  General, Minimally Invasive and Endoscopic Surgery  [facility] [address] [facility] [address] [place] [address]   [address]    [address]  [place] [address]               [place] [address]    P: [phone]  F: [phone]    Cc:  Arnie Gutierrez MD  "

## 2021-08-31 ENCOUNTER — OFFICE VISIT (OUTPATIENT)
Dept: ORTHOPEDIC SURGERY | Facility: CLINIC | Age: 31
End: 2021-08-31

## 2021-08-31 VITALS — WEIGHT: 173 LBS | BODY MASS INDEX: 27.15 KG/M2 | TEMPERATURE: 96.8 F | HEIGHT: 67 IN

## 2021-08-31 DIAGNOSIS — G89.29 CHRONIC RIGHT SHOULDER PAIN: ICD-10-CM

## 2021-08-31 DIAGNOSIS — R52 PAIN: Primary | ICD-10-CM

## 2021-08-31 DIAGNOSIS — M25.511 CHRONIC RIGHT SHOULDER PAIN: ICD-10-CM

## 2021-08-31 PROCEDURE — 73030 X-RAY EXAM OF SHOULDER: CPT | Performed by: ORTHOPAEDIC SURGERY

## 2021-08-31 PROCEDURE — 99203 OFFICE O/P NEW LOW 30 MIN: CPT | Performed by: ORTHOPAEDIC SURGERY

## 2021-08-31 NOTE — PROGRESS NOTES
Patient: Shalom Luna    YOB: 1990    Medical Record Number: [id number]    Chief Complaints:   Right shoulder pain    History of Present Illness:     31 y.o. male patient who presents with a complaint of right shoulder pain.  He reports that the symptoms first started about a year ago.  He was doing the butterfly stroke when he first started to feel the pain.  Since then, the shoulder has continued to bother him intermittently.  His pain is primarily on the top of the shoulder.  His typical pain is described as moderate, intermittent and aching.  The pain is worse when lying on this side and lifting.  He denies any alleviating factors.  He denies any shooting pain down the arm, weakness, numbness or paresthesias.    Allergies: No Known Allergies    Medications:   Home Medications:  No current outpatient medications on file.    Past Medical History:   Diagnosis Date   • Ulcer of abdomen wall (CMS/HCC)    • Ulcer of esophagus        Past Surgical History:   Procedure Laterality Date   • ADENOIDECTOMY     • CHOLECYSTECTOMY WITH INTRAOPERATIVE CHOLANGIOGRAM N/A 7/30/2021    Procedure: CHOLECYSTECTOMY LAPAROSCOPIC INTRAOPERATIVE CHOLANGIOGRAM;  Surgeon: Brittanie Sequeira MD;  Location: [facility] MAIN OR;  Service: General;  Laterality: N/A;   • ENDOSCOPY N/A 6/18/2021    Procedure: ESOPHAGOGASTRODUODENOSCOPY;  Surgeon: Brittanie Sequeira MD;  Location: [facility] MAIN OR;  Service: Gastroenterology;  Laterality: N/A;   • TONSILLECTOMY     • WISDOM TOOTH EXTRACTION         Social History     Occupational History   • Not on file   Tobacco Use   • Smoking status: Never Smoker   • Smokeless tobacco: Never Used   Substance and Sexual Activity   • Alcohol use: Yes     Alcohol/week: 7.0 standard drinks     Types: 7 Cans of beer per week   • Drug use: Not Currently   • Sexual activity: Yes     Partners: Female      Social History     Social History Narrative   • Not on file       Family History   Problem Relation Age  "of Onset   • Melanoma Father    • No Known Problems Brother    • No Known Problems Daughter    • Asthma Son    • Heart disease Maternal Grandmother    • Hypertension Maternal Grandmother    • Breast cancer Paternal Grandmother        Review of Systems:      Constitutional: Denies fever, shaking or chills   Eyes: Denies change in visual acuity   HEENT: Denies nasal congestion or sore throat   Respiratory: Denies cough or shortness of breath   Cardiovascular: Denies chest pain or edema  Endocrine: Denies tremors, palpitations, intolerance of heat or cold, polyuria, polydipsia.  GI: Denies abdominal pain, nausea, vomiting, bloody stools or diarrhea  : Denies frequency, urgency, incontinence, retention, or nocturia.  Musculoskeletal: Denies numbness, tingling or loss of motor function except as above  Integument: Denies rash, lesion or ulceration   Neurologic: Denies headache or focal weakness, deficits  Heme: Denies spontaneous or excessive bleeding, epistaxis, hematuria, melena, fatigue, enlarged or tender lymph nodes.      All other pertinent positives and negatives as noted above in HPI.    Physical Exam:   31 y.o. male  Vitals:    08/31/21 0819   Temp: 96.8 °F (36 °C)   Weight: 78.5 kg (173 lb)   Height: 170.2 cm (67\")     General:  Patient is awake and alert.  Appears in no acute distress or discomfort.    Psych:  Affect and demeanor are appropriate.    Eyes:  Conjunctiva and sclera appear grossly normal.  Eyes track well and EOM seem to be intact.    Ears:  No gross abnormalities.  Hearing adequate for the exam.    Dentition:  No gross abnormalities noted.    Cardiovascular:  Regular rate and rhythm.    Lungs:  Good chest expansion.  Breathing unlabored.    Lymph:  No palpable adenopathy about neck or axilla.    Neck:  Supple.  Normal ROM.  Negative Spurling's for shoulder or arm pain.    Right upper extremity: Skin is benign.  No gross abnormalities on inspection including any atrophy, swellings, or masses.  " No palpable masses or adenopathy.  Focal tenderness noted over the acromioclavicular joint.  Full shoulder motion.  No evident instability or apprehension.  Positive active compression maneuver which reproduces the patient's typical pain.  Good strength in the rotator cuff, deltoid, biceps, triceps, and .  Intact sensation throughout the arm.  Brisk cap refill.          Radiology:  AP, scapular Y, and axillary views of the right shoulder are ordered by myself and reviewed to evaluate the patient's complaint.  No comparison films are immediately available.  The x-rays show significant acromioclavicular arthritis with cystic change in the distal clavicle.  There are no obvious acute abnormalities, lesions, masses, significant glenohumeral degenerative changes, or other concerning findings.  The acromiohumeral interval is normal.  Glenoid version appears normal as well.    Assessment/Plan:   Right acromioclavicular arthritis    We discussed treatment options in detail.  We talked about conservative treatment options.  He has been modifying his activities but it continues to bother him.  He cannot take anti-inflammatories due to reflux.  He is not interested in injections.  He says that he has met his deductible for the year and would prefer to go ahead and get this problem addressed.  Have agreed to set him up for an MRI of the shoulder to rule out associated pathology.  I told him I will call him once I see the results.      Jj Quan MD    08/31/2021    CC to Arnie Gutierrez MD

## 2021-09-07 ENCOUNTER — TELEPHONE (OUTPATIENT)
Dept: ORTHOPEDIC SURGERY | Facility: CLINIC | Age: 31
End: 2021-09-07

## 2021-09-07 NOTE — TELEPHONE ENCOUNTER
Caller: PATIENT    Relationship: SELF     Best call back number: [phone]    What orders are you requesting (i.e. lab or imaging): MRI ORDER FOR RIGHT SHOULDER    Where will you receive your lab/imaging services: [facility] ON [address]  FAX #: [phone].     Additional notes: PATIENT WAS CALLING TO REQUEST HIS MRI ORDER FOR HIS RIGHT SHOULDER TO BE SENT TO [facility] [address]. PATIENT STATED HE IS UNABLE TO GET AN APPT AT [facility] FOR A FEW WEEKS AND IS WANTING TO GO TO [facility] TO GET HIS MRI DONE SOONER. PATIENT IS REQUESTING THE MRI ORDER TO BE FAXED OVER TO [facility] [phone]. IF YOU COULD CALL PATIENT WHEN THAT HAS BEEN TAKEN CARE. THANK YOU!

## 2021-09-17 ENCOUNTER — TELEPHONE (OUTPATIENT)
Dept: ORTHOPEDIC SURGERY | Facility: CLINIC | Age: 31
End: 2021-09-17

## 2021-09-17 NOTE — TELEPHONE ENCOUNTER
PATIENT WENT FOR MRI AT [facility] YESTERDA.Y AND IS CALLING FOR THE RESULTS.    I EXPLAINED TO PATIENT THAT WE MAY NOT GET BACK TO HIM UNTIL Monday AS I DID NOT SEE RESULTS SCANNED INTO OUR SYSTEM YET.

## 2021-09-20 ENCOUNTER — TELEPHONE (OUTPATIENT)
Dept: ORTHOPEDIC SURGERY | Facility: CLINIC | Age: 31
End: 2021-09-20

## 2021-09-20 NOTE — TELEPHONE ENCOUNTER
I spoke with him and gave him the MRI results.  We discussed surgical and nonsurgical treatment options.  He is undecided about how to proceed.  He tells me is going to discuss it with his wife and then let me know.

## 2021-09-24 ENCOUNTER — APPOINTMENT (OUTPATIENT)
Dept: MRI IMAGING | Facility: HOSPITAL | Age: 31
End: 2021-09-24

## 2021-09-27 ENCOUNTER — CLINICAL SUPPORT (OUTPATIENT)
Dept: ORTHOPEDIC SURGERY | Facility: CLINIC | Age: 31
End: 2021-09-27

## 2021-09-27 VITALS — WEIGHT: 173 LBS | BODY MASS INDEX: 27.15 KG/M2 | HEIGHT: 67 IN | TEMPERATURE: 97 F

## 2021-09-27 DIAGNOSIS — M25.511 CHRONIC RIGHT SHOULDER PAIN: Primary | ICD-10-CM

## 2021-09-27 DIAGNOSIS — G89.29 CHRONIC RIGHT SHOULDER PAIN: Primary | ICD-10-CM

## 2021-09-27 PROCEDURE — 20610 DRAIN/INJ JOINT/BURSA W/O US: CPT | Performed by: ORTHOPAEDIC SURGERY

## 2021-09-27 PROCEDURE — 99213 OFFICE O/P EST LOW 20 MIN: CPT | Performed by: ORTHOPAEDIC SURGERY

## 2021-09-27 RX ORDER — LIDOCAINE HYDROCHLORIDE 10 MG/ML
2 INJECTION, SOLUTION EPIDURAL; INFILTRATION; INTRACAUDAL; PERINEURAL
Status: COMPLETED | OUTPATIENT
Start: 2021-09-27 | End: 2021-09-27

## 2021-09-27 RX ORDER — METHYLPREDNISOLONE ACETATE 80 MG/ML
80 INJECTION, SUSPENSION INTRA-ARTICULAR; INTRALESIONAL; INTRAMUSCULAR; SOFT TISSUE
Status: COMPLETED | OUTPATIENT
Start: 2021-09-27 | End: 2021-09-27

## 2021-09-27 RX ADMIN — LIDOCAINE HYDROCHLORIDE 2 ML: 10 INJECTION, SOLUTION EPIDURAL; INFILTRATION; INTRACAUDAL; PERINEURAL at 13:44

## 2021-09-27 RX ADMIN — METHYLPREDNISOLONE ACETATE 80 MG: 80 INJECTION, SUSPENSION INTRA-ARTICULAR; INTRALESIONAL; INTRAMUSCULAR; SOFT TISSUE at 13:44

## 2021-09-27 NOTE — PROGRESS NOTES
Patient: Shalom Luna    YOB: 1990    Medical Record Number: [id number]    Chief Complaints: Follow-up regarding right shoulder pain    History of Present Illness:     31 y.o. male patient who presents for follow-up of the right shoulder.  He recently got an MRI and presents today to review that study and discuss further options.    Allergies: No Known Allergies    Home Medications:  No current outpatient medications on file.    Past Medical History:   Diagnosis Date   • Ulcer of abdomen wall (CMS/HCC)    • Ulcer of esophagus        Past Surgical History:   Procedure Laterality Date   • ADENOIDECTOMY     • CHOLECYSTECTOMY WITH INTRAOPERATIVE CHOLANGIOGRAM N/A 7/30/2021    Procedure: CHOLECYSTECTOMY LAPAROSCOPIC INTRAOPERATIVE CHOLANGIOGRAM;  Surgeon: Brittanie Sequeira MD;  Location: [facility] MAIN OR;  Service: General;  Laterality: N/A;   • ENDOSCOPY N/A 6/18/2021    Procedure: ESOPHAGOGASTRODUODENOSCOPY;  Surgeon: Brittanie Sequeira MD;  Location: [facility] MAIN OR;  Service: Gastroenterology;  Laterality: N/A;   • TONSILLECTOMY     • WISDOM TOOTH EXTRACTION         Social History     Occupational History   • Not on file   Tobacco Use   • Smoking status: Never Smoker   • Smokeless tobacco: Never Used   Vaping Use   • Vaping Use: Never assessed   Substance and Sexual Activity   • Alcohol use: Yes     Alcohol/week: 7.0 standard drinks     Types: 7 Cans of beer per week   • Drug use: Not Currently   • Sexual activity: Yes     Partners: Female      Social History     Social History Narrative   • Not on file       Family History   Problem Relation Age of Onset   • Melanoma Father    • No Known Problems Brother    • No Known Problems Daughter    • Asthma Son    • Heart disease Maternal Grandmother    • Hypertension Maternal Grandmother    • Breast cancer Paternal Grandmother        Review of Systems:      Constitutional: Denies fever, shaking or chills   Eyes: Denies change in visual acuity   HEENT: Denies  "nasal congestion or sore throat   Respiratory: Denies cough or shortness of breath   Cardiovascular: Denies chest pain or edema  Endocrine: Denies tremors, palpitations, intolerance of heat or cold, polyuria, polydipsia.  GI: Denies abdominal pain, nausea, vomiting, bloody stools or diarrhea  : Denies frequency, urgency, incontinence, retention, or nocturia.  Musculoskeletal: Denies numbness, tingling or loss of motor function except as above  Integument: Denies rash, lesion or ulceration   Neurologic: Denies headache or focal weakness, deficits  Heme: Denies spontaneous or excessive bleeding, epistaxis, hematuria, melena, fatigue, enlarged or tender lymph nodes.      All other pertinent positives and negatives as noted above in HPI.    Physical Exam:   31 y.o. male  Vitals:    09/27/21 1547   Temp: 97 °F (36.1 °C)   Weight: 78.5 kg (173 lb)   Height: 170.2 cm (67.01\")     General:  Patient is awake and alert.  Appears in no acute distress or discomfort.    Psych:  Affect and demeanor are appropriate.    Eyes:  Conjunctiva and sclera appear grossly normal.  Eyes track well and EOM seem to be intact.    Ears:  No gross abnormalities.  Hearing adequate for the exam.    Cardiovascular:  Regular rate and rhythm.    Lungs:  Good chest expansion.  Breathing unlabored.    Extremities: Right shoulder is examined.  Skin is benign.  Focal tenderness over the acromioclavicular joint.  Positive active compression maneuver.  Full shoulder motion.  Negative Neer and Patterson impingement maneuvers.  Good strength in his rotator cuff..  Normal motor and sensory function in the lower arm and hand.  Palpable radial pulse.  Brisk capillary refill.    Imaging: MRI of the right shoulder is reviewed along with the associated report.  He has moderate acromioclavicular arthritis with an effusion and edema in the joint.  He has a trace amount of subacromial/subdeltoid bursal fluid consistent with bursitis.  Mild rotator cuff tendinopathy " but no tears.    Assessment/Plan:  Right shoulder symptomatic acromioclavicular arthritis    Despite the minimal bursal fluid noted on his MRI, his exam does not suggest any bursitis.  It appears to me that most of his pain is coming from the acromioclavicular joint.  We discussed the natural history of this condition and all available treatment options, both surgical and nonsurgical.  He acknowledged understanding of this information.  All questions posed by the patient were answered in detail.  He elected for an injection today.  The risk, benefits and alternatives were discussed.  He consented and the injection was performed as described below.  He will follow-up as needed.    Jj Quan MD    09/27/2021      Large Joint Arthrocentesis: R subacromial bursa  Date/Time: 9/27/2021 1:44 PM  Consent given by: patient  Site marked: site marked  Timeout: Immediately prior to procedure a time out was called to verify the correct patient, procedure, equipment, support staff and site/side marked as required   Supporting Documentation  Indications: pain   Procedure Details  Location: shoulder - R subacromial bursa  Preparation: Patient was prepped and draped in the usual sterile fashion  Needle gauge: 21G.  Approach: posterior  Medications administered: 80 mg methylPREDNISolone acetate 80 MG/ML; 2 mL lidocaine PF 1% 1 %  Patient tolerance: patient tolerated the procedure well with no immediate complications

## 2022-02-07 ENCOUNTER — CLINICAL SUPPORT (OUTPATIENT)
Dept: ORTHOPEDIC SURGERY | Facility: CLINIC | Age: 32
End: 2022-02-07

## 2022-02-07 VITALS — WEIGHT: 173 LBS | BODY MASS INDEX: 27.15 KG/M2 | TEMPERATURE: 98.5 F | HEIGHT: 67 IN

## 2022-02-07 DIAGNOSIS — M25.511 CHRONIC RIGHT SHOULDER PAIN: Primary | ICD-10-CM

## 2022-02-07 DIAGNOSIS — G89.29 CHRONIC RIGHT SHOULDER PAIN: Primary | ICD-10-CM

## 2022-02-07 PROCEDURE — 20610 DRAIN/INJ JOINT/BURSA W/O US: CPT | Performed by: ORTHOPAEDIC SURGERY

## 2022-02-07 RX ORDER — TRIAMCINOLONE ACETONIDE 40 MG/ML
80 INJECTION, SUSPENSION INTRA-ARTICULAR; INTRAMUSCULAR
Status: COMPLETED | OUTPATIENT
Start: 2022-02-07 | End: 2022-02-07

## 2022-02-07 RX ADMIN — TRIAMCINOLONE ACETONIDE 80 MG: 40 INJECTION, SUSPENSION INTRA-ARTICULAR; INTRAMUSCULAR at 14:49

## 2022-02-07 NOTE — PROGRESS NOTES
Shalom comes in today for follow-up.  The last injection took away 100 % of his pain for several months.  He would like to get a repeat injection today.  The risks, benefits and alternatives were discussed and he consented.  I did tell him that I do not like the idea of serial injections for him because of his young age.  I am willing to do another injection or 2 but I think after that he needs to consider the surgery.  He tells me he wants to think about it.  Again, for now, he just wants the repeat injection.  This was performed as described below.  Going forward, the patient will follow-up as needed.    Jj Quan MD    02/07/2022    Medium joint arthrocentesis:  AC joint  Date/Time: 2/7/2022 2:49 PM  Consent given by: patient  Site marked: site marked  Timeout: Immediately prior to procedure a time out was called to verify the correct patient, procedure, equipment, support staff and site/side marked as required   Supporting Documentation  Indications: pain and joint swelling   Procedure Details  Location: shoulder (AC joint) - R subacromial bursa  Preparation: Patient was prepped and draped in the usual sterile fashion  Needle gauge: 21g.  Approach: anterolateral  Medications administered: 2 mL lidocaine (cardiac); 80 mg triamcinolone acetonide 40 MG/ML  Patient tolerance: patient tolerated the procedure well with no immediate complications

## 2022-05-17 ENCOUNTER — TELEPHONE (OUTPATIENT)
Dept: ORTHOPEDIC SURGERY | Facility: CLINIC | Age: 32
End: 2022-05-17

## 2022-05-19 ENCOUNTER — PREP FOR SURGERY (OUTPATIENT)
Dept: OTHER | Facility: HOSPITAL | Age: 32
End: 2022-05-19

## 2022-05-19 DIAGNOSIS — M25.511 CHRONIC RIGHT SHOULDER PAIN: Primary | ICD-10-CM

## 2022-05-19 DIAGNOSIS — G89.29 CHRONIC RIGHT SHOULDER PAIN: Primary | ICD-10-CM

## 2022-05-19 RX ORDER — CEFAZOLIN SODIUM 2 G/100ML
2 INJECTION, SOLUTION INTRAVENOUS ONCE
Status: CANCELLED | OUTPATIENT
Start: 2022-11-08 | End: 2022-05-19

## 2022-06-06 ENCOUNTER — E-VISIT (OUTPATIENT)
Dept: FAMILY MEDICINE CLINIC | Facility: TELEHEALTH | Age: 32
End: 2022-06-06

## 2022-06-06 PROBLEM — M25.511 CHRONIC RIGHT SHOULDER PAIN: Status: ACTIVE | Noted: 2022-06-06

## 2022-06-06 PROBLEM — G89.29 CHRONIC RIGHT SHOULDER PAIN: Status: ACTIVE | Noted: 2022-06-06

## 2022-06-06 PROCEDURE — BRIGHTMDVISIT: Performed by: NURSE PRACTITIONER

## 2022-06-06 NOTE — EXTERNAL PATIENT INSTRUCTIONS
Diagnosis   Otitis media   My name is Richelle Hubbard. I'm a healthcare provider at [facility].   I've reviewed your interview, and I believe you have an infection of the middle ear (otitis media).   Medications   Your pharmacy   [id number] [place] [address] [facility] [id number] [phone]     Prescription   Ibuprofen (800mg): Take 1 tablet by mouth every 8 hours for up to 10 days as needed for any fever, pain, or discomfort associated with your condition. Take with food to avoid upset stomach. Do not exceed 3200 mg (4 tablets) in a 24-hour period.   Cefdinir (300mg): Take 2 capsules by mouth daily for 5 days for infection. This medication is an antibiotic. Take it exactly as directed. You must finish the entire course of medication, even if you feel better after the first few days of treatment.    I've given you a prescription dose of ibuprofen. If it's more affordable or convenient, you may use the equivalent amount of non-prescription ibuprofen. For example, instead of taking one 800 mg ibuprofen tablet, you may take four 200 mg ibuprofen tablets. Don't take ibuprofen with other non-steroidal anti-inflammatory drugs (NSAIDs), including naproxen or aspirin. Taking these medications together can increase the risk of serious side effects.   Non-prescription   Fluticasone (50mcg): Spray 2 sprays in each nostril daily for 14 days for ear discomfort or allergies. Fluticasone needs to be used every day to work. It may take up to a week for the full effects of the medication to be seen.    You mentioned that you've recently had cold or allergy symptoms. I've recommended medications to help with these symptoms.   About your diagnosis   Otitis media is an infection of the space behind the eardrum. It's one of the most common types of ear infection. Ear infections usually happen after a viral upper respiratory tract infection, also known as the common cold. During a cold, fluid can build  up in the middle ear. Bacteria can grow in this fluid buildup and lead to an infection.   The good news is that middle-ear infections usually aren't serious and generally respond well to treatment.   What to expect   If you follow this treatment plan, you should feel better in 2 to 3 days.   When to seek care   Call us at [phone]   with any sudden or unexpected symptoms.    Symptoms that don't improve after 48 to 72 hours of treatment, or symptoms that get worse despite treatment.    Pain, tenderness, redness, or swelling on the bony part behind your ear along with a fever.    Your fever goes above 103F or continues for more than 4 days.    Fever that returns after being gone for more than 24 hours.    The outside of your ear gets swollen and red.    The muscles in your face become paralyzed.    The pain in your ear or the bones around your ear gets worse.    Your hearing loss worsens or doesn't improve.    New ear drainage that brings sudden pain relief.    Bloody ear drainage.    Vomiting.   Prevention   Don't smoke, and try to avoid any kind of smoke or air pollution. Smoke can irritate the ear and worsen infections.   Your provider   Your diagnosis was provided by Richelle Hubbard, a member of your trusted care team at [facility].   If you have any questions, call us at [phone]  .

## 2022-06-06 NOTE — E-VISIT TREATED
Chief Complaint: Ear pain   Patient introduction   Patient is 32-year-old male who reports a blocked sensation, a sensation of fullness, pressure, and sharp, shooting/stabbing pain in left ear.   Patient did not request an excuse note.   General presentation   Patient first noticed symptoms > 5 days ago. They came on gradually. Symptoms are always there. Patient rates their pain as moderate to severe (7-9/10).   Patient reports fever. Patient's fever lasted 4 or more days and is gone now. Patient didn't measure their temperature, but reports it felt mild.   No symptoms of tinnitus. Reports mild hearing loss in both ears. There has been no drainage.   Denies history of PE tubes.   Denies either ear is red, swollen, warm to the touch, or painful to the touch.   Patient also reports:    Respiratory symptoms, including runny nose, stuffy nose, and sinus pain or pressure. Reports sinus/nasal symptoms for 6-9 days. Patient reports improvement of symptoms. Reports green nasal drainage. Nasal drainage is thick.    Headache described as mild/moderate.    Mild dizziness that does not affect patient's ability to stand or walk.   Denies recent airplane travel, scuba diving, hiking or driving in the mountains, or exposure to a loud blast or explosion. Denies swimming or water in ears in the last few weeks. No recent exposure to tobacco smoke, smoke from a fire or wood-burning stove, or air pollution. Denies regularly using hearing aids, earbuds or in-ear headphones, swim caps, cotton swabs, or ear canal irrigation kits.   Last antibiotic treatment for an ear infection was > 1 year ago.   Denies the following red flags:    Current treatment by ENT    Current PE tubes in affected ear(s)    Mastoid or ear surgery in the last 5 years    Mastoid redness, warmth, or pain    Sharp or pointed object in ear canal    Foreign body in ear    Recent head trauma    Vision changes    Symptoms of periauricular cellulitis or perichondritis     Severe dizziness or vertigo    Hearing loss in both ears   Self-exam: Patient reports:    No difficulty moving their chin toward their chest    No mastoid redness, warmth, or pain    Pain is unchanged by chewing or moving the jaw    Pain is unaffected by manipulation of the pinna and/or pressure on the tragus    Pain is worse when lying down   Current medications   Patient has used the following OTC medication(s) for their ear symptoms: acetaminophen, cetirizine, fluticasone, ibuprofen, oxymetazoline, and phenylephrine.   Reports taking Pseudoephedrine Extended Release Tablet [Sudafed], Guaifenesin 1200 MG [Mucinex], fluticasone Nasal Inhaler [Flonase], and Breathe Free Saline.   Medication allergies   None.   Medication contraindication review   Denies history of arrhythmia, benign prostatic hypertrophy, congestive heart failure, recent myocardial infarction, heart block, heart disease, hypertension, hyperthyroidism, mononucleosis, and myasthenia gravis.   No known history of amoxicillin-clavulanate-associated jaundice or hepatic impairment.   No known history of azithromycin-associated cholestatic jaundice or hepatic impairment.   Denies history of aspirin triad; NSAID-induced asthma/urticaria; coronary artery disease; coagulation disorder; urinary retention; electrolyte abnormalities; G6PD deficiency; GI bleeding; hypertension; influenza, varicella, or febrile viral infection; or CABG surgery.   Past medical history   Immune conditions: Denies immunocompromising conditions. Denies history of cancer.   Assessment   Otitis media.   This is the likely diagnosis based on patient's interview responses, including:    Ear symptoms described as a blocked sensation, a sensation of fullness, pressure, and sharp, shooting/stabbing pain    Fever    Dizziness or vertigo    Symptoms worse when lying down    Recent cold or allergy symptoms including runny nose, stuffy nose, and sinus pain or pressure   Plan   Medications:     ibuprofen 800 mg tablet RX 800mg 1 tab PO q8h 10d PRN for any fever, pain, or discomfort associated with your condition. Take with food to avoid upset stomach. Do not exceed 3200 mg (4 tablets) in a 24-hour period. Amount is 30 tab.    cefdinir 300 mg capsule RX 300mg 2 caps PO qd 5d for infection. This medication is an antibiotic. Take it exactly as directed. You must finish the entire course of medication, even if you feel better after the first few days of treatment. Amount is 10 cap.    fluticasone 50 mcg/actuation nasal spray,suspension OTC 50mcg 2 sprays each nostril nasal qd 14d for ear discomfort or allergies. Fluticasone needs to be used every day to work. It may take up to a week for the full effects of the medication to be seen. Amount is 16 g.   The patient's prescriptions will be sent to:   [facility][id number]   [address] [id number]   Phone: [phone]     Fax: [phone]   Patient informed to purchase OTC medication.   Education:    Condition and causes    Prevention    Treatment and self-care    When to call provider   ----------   Electronically signed by KRYSTINA Flynn on 2022-06-06 at 08:17AM   ----------   Patient Interview Transcript:   How would you describe your ear pain or discomfort? Select all that apply.    Blocked or plugged    Full    Pressure    Shooting/stabbing/sharp   Not selected:    Achy    Itchy    Crackling or popping    Throbbing   On a scale of 1 to 10, how severe is your ear pain? Select one.    Moderate to severe (7 to 9); it's intense   Not selected:    Mild (1 to 3); it bothers me a little bit    Moderate (4 to 6); it's pretty uncomfortable    Very severe; it's unbearable (10+)   Which ear is affected? Select one.    My left ear   Not selected:    My right ear    Both of my ears   When did you first notice this ear pain or discomfort? Select one.    More than 5 days ago   Not selected:    Today    Yesterday    2 to 3 days  ago    4 to 5 days ago   Did your ear pain or discomfort come on suddenly or over time? Select one.    Over time   Not selected:    Suddenly    I'm not sure   Is the outside of the affected ear red, swollen, warm to the touch, or painful to the touch? Select all that apply.    None of these   Not selected:    Red    Swollen    Warm to the touch    Painful to the touch   Is your ear pain or discomfort always there, or does it come and go? Select one.    Always there   Not selected:    Comes and goes    I'm not sure   Does the pain or discomfort get worse when you bite or chew? Select one.    No   Not selected:    Yes   Along with your ear symptoms, have you had a fever or felt hot? Select one.    Yes   Not selected:    No   Do you have a fever now? Select one.    No   Not selected:    Yes   How long did your fever last? Select one.    4 or more days   Not selected:    Less than 24 hours    1 to 3 days   When you had a fever, did you measure your temperature with a thermometer? Select one.    No, but it felt mild   Not selected:    Yes    No, but it felt high   Do you have any of these on the same side as your affected ear?    None of the above   Not selected:    Pain or tenderness over the bone behind your ear    Redness over the bone behind your ear    Warmth over the bone behind your ear   Do your symptoms include the sudden onset of ringing in one or both ears? Select one.    No   Not selected:    Yes   Do your symptoms include hearing loss? Select one.    Yes, I can't hear as well as I usually do   Not selected:    Yes, I can't hear at all in either ear    No   Is your hearing loss in one ear or both ears? Select one.    Both ears   Not selected:    One ear   Has there been fluid draining out of either ear? Select one.    No   Not selected:    Yes, but no more than usual    Yes, and this is new or more than the usual amount   Along with your ear symptoms, have you had any of these cold symptoms? Select all that  apply.    Runny nose    Stuffy nose (nasal congestion)    Sinus pain or pressure   Not selected:    Postnasal drip    Cough    Scratchy or sore throat    None of the above   How long have you had these nasal or sinus symptoms? Select one.    6 to 9 days   Not selected:    Less than 48 hours    3 to 5 days    10 to 14 days    2 to 4 weeks    1 month or longer   Have your nasal or sinus symptoms improved at all since they began? Select one.    Yes, but they haven't gone away completely   Not selected:    Yes, but then they came back worse than before    No    I'm not sure   What color is your nose drainage? Select one.    Green   Not selected:    Clear    White    Yellow    Red or bloody    None of the above   Is your nasal drainage thick or thin? Select one.    Thick   Not selected:    Thin or watery   Along with your ear symptoms, do you have any of these throat or digestion symptoms? Select all that apply.    None of these   Not selected:    Burning feeling in your chest (heartburn)    Swallowed food or liquids getting stuck and coming back up    Feeling like there's a lump in your throat    Hoarse voice    Difficulty swallowing   Along with your ear symptoms, have you had a headache? Select one.    Yes, and the pain is mild to moderate   Not selected:    Yes, and the pain is severe    Yes, and the pain is unbearable    Yes, and it's the worst headache of my life    No   Have you had any of these vision changes? Select all that apply.    None of these   Not selected:    Blurry vision    Double vision    I can't see at all from one or both eyes   Along with your ear symptoms, have you felt dizzy or had vertigo? This includes feeling like you're spinning, swaying, or tilting; feeling light-headed; or feeling like the room is moving around you. Select one.    Yes   Not selected:    No   Has your dizziness or vertigo been so severe that you can't walk without assistance? Select one.    No   Not selected:    Yes   Do  your symptoms include vomiting? Select one.    No   Not selected:    Yes   Can you move your chin toward your chest? Select one.    Yes   Not selected:    No, my neck is too stiff   Does your ear pain or discomfort get worse if you do either of these: 1. Pull the cartilage part of your ear up and back 2. Push on your tragus (the flesh in front of your ear opening)    No   Not selected:    Yes   Take a moment and lie down. Does your ear pain or discomfort feel worse when you lie down? Select one.    Yes   Not selected:    No   Right before your symptoms started, did you put anything sharp or pointed into your ear canal? Select one.    No   Not selected:    Yes   Is it possible that you have something stuck in your ear canal? Examples include a bead, button, rock, or part of an earbud. Select one.    No   Not selected:    Yes   Right before your ear pain began, did you have a severe head or ear injury? Examples include: - A blow to your head or ear - Hitting your head or ear on a hard surface - Falling on your ear while skateboarding or skiing - A motor vehicle accident - A sports injury, such as in wrestling - Penetrating trauma, such as a knife wound Select one.    No   Not selected:    Yes   In the week before your symptoms started, did any of these apply to you? Select all that apply.    None of the above   Not selected:    Air travel    Scuba diving    Hiking or driving in the mountains    Exposed to a loud blast or explosion   In the few weeks before your symptoms started, did you go swimming or get water in one or both ears? Select one.    No   Not selected:    Yes   Have you recently been exposed to more smoke or air pollution than usual? Select all that apply.    None of the above   Not selected:    Yes, tobacco smoke    Yes, smoke from a fire or wood-burning stove    Yes, air pollution   Do you regularly use any of these items? Select all that apply.    None of the above   Not selected:    Hearing aids     Earbuds or in-ear headphones    Swim caps    Cotton swabs to clean your ears    Earwax removal devices, such as an irrigation kit   Are you being treated by an Ear, Nose and Throat (ENT) doctor for an ear condition? Select one.    No   Not selected:    Yes   Do you have ear tubes? Some other names for ear tubes are tympanostomy tubes, ventilation tubes, or pressure equalization (PE) tubes. Select one.    No   Not selected:    Yes, in my left ear only    Yes, in my right ear only    Yes, in both ears    No, but I've had them in the past   In the past 5 years, have you had mastoid surgery or ear surgery (not including ear tube placement or removal)? Select one.    No   Not selected:    Yes   When was the last time you were treated with antibiotics for an ear infection? This includes both oral antibiotics and antibiotic ear drops. Select one.    More than a year ago   Not selected:    Never    Within the last month    1 to 3 months ago    4 months to a year ago    I'm not sure   Do you have any of these conditions that can affect the immune system? Scroll to see all options. Select all that apply.    None of these   Not selected:    History of bone marrow transplant    Chronic kidney disease    Chronic liver disease (including cirrhosis)    HIV/AIDS    Inflammatory bowel disease (Crohn's disease or ulcerative colitis)    Lupus    Moderate to severe plaque psoriasis    Multiple sclerosis    Rheumatoid arthritis    Sickle cell anemia    Alpha or beta thalassemia    History of solid organ transplant (kidney, liver, or heart)    History of spleen removal    An autoimmune disorder not listed here    A condition requiring treatment with long-term use of oral steroids (such as prednisone, prednisolone, or dexamethasone)   Have you ever been diagnosed with cancer? Select one.    No   Not selected:    Yes, I have cancer now    Yes, but I'm in remission   The next few questions help us recommend the best treatment for you. Have  you used any of these non-prescription medications for your ear symptoms? Scroll to see all options. Select all that apply.    Acetaminophen (Tylenol)    Cetirizine (Zyrtec)    Fluticasone (Flonase)    Ibuprofen (Advil, Motrin, Midol)    Oxymetazoline (Afrin)    Phenylephrine (Sudafed)   Not selected:    Carbamide peroxide otic (Auro, Debrox)    Diphenhydramine (Benadryl)    Fexofenadine (Allegra)    Naproxen (Aleve)    Isopropyl alcohol in glycerin ear drops (Swim Ear drops)    Loratadine (Alavert, Claritin)    Triamcinolone (Nasacort)    None of these   Are you taking any other medications or supplements? On the next screen, you need to list all vitamins, supplements, non-prescription medications (such as aspirin or Aleve), and prescription medications that you're taking. Select one.    Yes   Not selected:    Yes, but I'm not sure what they are    No   Have you ever had an allergic or bad reaction to any medication? Select one.    No   Not selected:    Yes   Are you currently being treated for any of these conditions? Scroll to see all options. Select all that apply.    None of the above   Not selected:    Arrhythmia    BPH (prostatic hypertrophy, or enlarged prostate)    Congestive heart failure    Recent heart attack    Heart block    Blockage or narrowing of the blood vessels of the heart    Hypertension    Hyperthyroidism    Mononucleosis    Myasthenia gravis   Have you ever had jaundice or liver problems as a result of taking amoxicillin-clavulanate (Augmentin)? Select all that apply.    No, not that I know of   Not selected:    Yes, jaundice    Yes, liver problems   Have you ever had jaundice or liver problems as a result of taking azithromycin (Zithromax, Zmax)? Select all that apply.    No, not that I know of   Not selected:    Yes, jaundice    Yes, liver problems   Have you had any of these conditions? Scroll to see all options. Select all that apply.    None of the above   Not selected:    Aspirin triad  (also known as Samter's triad or ASA triad)    Asthma or hives from taking aspirin or other NSAIDs, such as ibuprofen or naproxen    Coagulation disorder    Difficulty urinating or completely emptying your bladder    Electrolyte abnormalities    G6PD deficiency    Gastrointestinal (GI) bleeding    Influenza, chickenpox, or a viral infection with fever    Recent coronary artery bypass graft (CABG) surgery   Have you taken any monoamine oxidase inhibitor (MAOI) medications within the last 14 days? Examples include rasagiline (Azilect), selegiline (Eldepryl, Zelapar), isocarboxazid (Marplan), phenelzine (Nardil), and tranylcypromine (Parnate). Select one.    No   Not selected:    Yes   Do you need a doctor's note? A doctor's note confirms that you received care today and states when you can return to school or work. It does not contain information about your diagnosis or treatment plan. Your provider will make the final decision on whether to give you a doctor's note. Doctor's notes CANNOT be backdated. Select one.    No   Not selected:    Today only (1 day)    Today and tomorrow (2 days)    3 days   Is there anything else you'd like to tell us about your symptoms?   The patient did not enter any additional information.   ----------   Medical history   Medical history data does not currently exist for this patient.

## 2022-06-09 RX ORDER — PREDNISONE 20 MG/1
20 TABLET ORAL 2 TIMES DAILY
Qty: 6 TABLET | Refills: 0 | Status: SHIPPED | OUTPATIENT
Start: 2022-06-09 | End: 2022-06-12

## 2022-06-09 RX ORDER — CEFDINIR 300 MG/1
300 CAPSULE ORAL 2 TIMES DAILY
Qty: 10 CAPSULE | Refills: 0 | Status: SHIPPED | OUTPATIENT
Start: 2022-06-09 | End: 2022-06-14

## 2022-07-13 ENCOUNTER — TELEPHONE (OUTPATIENT)
Dept: ORTHOPEDIC SURGERY | Facility: CLINIC | Age: 32
End: 2022-07-13

## 2022-07-13 NOTE — TELEPHONE ENCOUNTER
Caller:BLAISE CHAPMAN    Relationship to patient: SELF    Best call back number: [phone]    Chief complaint: RIGHT SHOULDER PAIN    Type of visit: INJ    Requested date: ASAP    If rescheduling, when is the original appointment:      Additional notes:PATIENT STATES HE ALSO POSTED A REQ ON HIS [employer]

## 2022-07-20 ENCOUNTER — CLINICAL SUPPORT (OUTPATIENT)
Dept: ORTHOPEDIC SURGERY | Facility: CLINIC | Age: 32
End: 2022-07-20

## 2022-07-20 VITALS — BODY MASS INDEX: 27.15 KG/M2 | TEMPERATURE: 97.1 F | WEIGHT: 173 LBS | HEIGHT: 67 IN

## 2022-07-20 DIAGNOSIS — M25.511 CHRONIC RIGHT SHOULDER PAIN: Primary | ICD-10-CM

## 2022-07-20 DIAGNOSIS — G89.29 CHRONIC RIGHT SHOULDER PAIN: Primary | ICD-10-CM

## 2022-07-20 PROCEDURE — 20605 DRAIN/INJ JOINT/BURSA W/O US: CPT | Performed by: ORTHOPAEDIC SURGERY

## 2022-07-20 RX ORDER — IBUPROFEN 800 MG/1
TABLET ORAL
COMMUNITY
Start: 2022-06-06

## 2022-07-20 RX ORDER — METHYLPREDNISOLONE ACETATE 80 MG/ML
80 INJECTION, SUSPENSION INTRA-ARTICULAR; INTRALESIONAL; INTRAMUSCULAR; SOFT TISSUE
Status: COMPLETED | OUTPATIENT
Start: 2022-07-20 | End: 2022-07-20

## 2022-07-20 RX ORDER — LORATADINE 10 MG/1
CAPSULE, LIQUID FILLED ORAL
COMMUNITY
Start: 2022-06-26

## 2022-07-20 RX ORDER — LIDOCAINE HYDROCHLORIDE 10 MG/ML
1 INJECTION, SOLUTION EPIDURAL; INFILTRATION; INTRACAUDAL; PERINEURAL
Status: COMPLETED | OUTPATIENT
Start: 2022-07-20 | End: 2022-07-20

## 2022-07-20 RX ADMIN — LIDOCAINE HYDROCHLORIDE 1 ML: 10 INJECTION, SOLUTION EPIDURAL; INFILTRATION; INTRACAUDAL; PERINEURAL at 08:40

## 2022-07-20 RX ADMIN — METHYLPREDNISOLONE ACETATE 80 MG: 80 INJECTION, SUSPENSION INTRA-ARTICULAR; INTRALESIONAL; INTRAMUSCULAR; SOFT TISSUE at 08:40

## 2022-07-20 NOTE — PROGRESS NOTES
Shalom follows up again today for the right acromioclavicular joint.  The last injection did help tremendously.  He is tentatively scheduled for surgery in November but he would like to get the injection repeated again today.  The risk, benefits and alternatives were discussed but he consented and the injection was performed as described below.    Medium Joint Arthrocentesis: R acromioclavicular  Date/Time: 7/20/2022 8:40 AM  Consent given by: patient  Site marked: site marked  Timeout: Immediately prior to procedure a time out was called to verify the correct patient, procedure, equipment, support staff and site/side marked as required   Supporting Documentation  Indications: pain   Procedure Details  Location: shoulder - R acromioclavicular  Preparation: Patient was prepped and draped in the usual sterile fashion  Needle size: 25 G  Approach: anteromedial  Medications administered: 80 mg methylPREDNISolone acetate 80 MG/ML; 1 mL lidocaine PF 1% 1 %  Patient tolerance: patient tolerated the procedure well with no immediate complications

## 2022-10-18 ENCOUNTER — APPOINTMENT (OUTPATIENT)
Dept: PREADMISSION TESTING | Facility: HOSPITAL | Age: 32
End: 2022-10-18

## 2022-10-26 ENCOUNTER — TELEPHONE (OUTPATIENT)
Dept: ORTHOPEDIC SURGERY | Facility: CLINIC | Age: 32
End: 2022-10-26

## 2022-10-26 ENCOUNTER — APPOINTMENT (OUTPATIENT)
Dept: PREADMISSION TESTING | Facility: HOSPITAL | Age: 32
End: 2022-10-26

## 2022-10-26 NOTE — TELEPHONE ENCOUNTER
Provider: JUAREZ  Caller:BLAISE  Phone Number: [phone]  Reason for Call: PT  IS DECIDED NOT TO HAVE SX. HE WOULD LIKE TO CANCEL ALL APPT LINKED TO THAT SX. HE IS NOT HAVING ANY MORE PAIN

## 2023-04-25 DIAGNOSIS — K21.9 GASTROESOPHAGEAL REFLUX DISEASE WITHOUT ESOPHAGITIS: ICD-10-CM

## 2023-04-25 DIAGNOSIS — R10.13 EPIGASTRIC PAIN: Primary | ICD-10-CM

## 2023-04-25 RX ORDER — PANTOPRAZOLE SODIUM 40 MG/1
40 TABLET, DELAYED RELEASE ORAL DAILY
Qty: 90 TABLET | Refills: 3 | Status: SHIPPED | OUTPATIENT
Start: 2023-04-25

## 2023-05-09 ENCOUNTER — OFFICE VISIT (OUTPATIENT)
Dept: GASTROENTEROLOGY | Facility: CLINIC | Age: 33
End: 2023-05-09
Payer: COMMERCIAL

## 2023-05-09 ENCOUNTER — PREP FOR SURGERY (OUTPATIENT)
Dept: SURGERY | Facility: SURGERY CENTER | Age: 33
End: 2023-05-09
Payer: COMMERCIAL

## 2023-05-09 VITALS
OXYGEN SATURATION: 97 % | SYSTOLIC BLOOD PRESSURE: 118 MMHG | BODY MASS INDEX: 28.75 KG/M2 | HEIGHT: 67 IN | DIASTOLIC BLOOD PRESSURE: 84 MMHG | WEIGHT: 183.2 LBS | TEMPERATURE: 98 F | HEART RATE: 73 BPM

## 2023-05-09 DIAGNOSIS — R10.13 EPIGASTRIC PAIN: Primary | ICD-10-CM

## 2023-05-09 DIAGNOSIS — R07.89 ATYPICAL CHEST PAIN: ICD-10-CM

## 2023-05-09 DIAGNOSIS — K59.00 INFREQUENT BOWEL MOVEMENTS: ICD-10-CM

## 2023-05-09 DIAGNOSIS — K21.9 GASTROESOPHAGEAL REFLUX DISEASE WITHOUT ESOPHAGITIS: ICD-10-CM

## 2023-05-09 PROCEDURE — 99203 OFFICE O/P NEW LOW 30 MIN: CPT | Performed by: NURSE PRACTITIONER

## 2023-05-09 RX ORDER — SODIUM CHLORIDE 0.9 % (FLUSH) 0.9 %
3 SYRINGE (ML) INJECTION EVERY 12 HOURS SCHEDULED
OUTPATIENT
Start: 2023-05-09

## 2023-05-09 RX ORDER — SODIUM CHLORIDE, SODIUM LACTATE, POTASSIUM CHLORIDE, CALCIUM CHLORIDE 600; 310; 30; 20 MG/100ML; MG/100ML; MG/100ML; MG/100ML
30 INJECTION, SOLUTION INTRAVENOUS CONTINUOUS PRN
OUTPATIENT
Start: 2023-05-09

## 2023-05-09 RX ORDER — SODIUM CHLORIDE 0.9 % (FLUSH) 0.9 %
10 SYRINGE (ML) INJECTION AS NEEDED
OUTPATIENT
Start: 2023-05-09

## 2023-05-09 NOTE — PROGRESS NOTES
"Chief Complaint   Patient presents with   • Abdominal Pain           History of Present Illness  33-year-old male presents today for epigastric pain.  He has a new patient with our practice.  He has a history of esophageal ulcers and reflux in 2011 and H pylori Ag.  Symptoms started January 21, 2023 with epigastric pain and associated nausea, shortness of air, diaphoresis.    He was evaluated in the emergency department January 21, 2023 and diagnosed with esophageal spasms.     Since starting pantoprazole he is having less severe symptoms and less frequent, approx 1-2 months.   Prior to starting pantoprazole he was having symptoms a Couple times per week.    He is status postcholecystectomy July 30, 2021  Previous EGD June 18, 2021 with mild chronic gastritis, normal small bowel biopsies.  GE junction biopsies with extensive chronic inflammation of the gastric mucosa, no intestinal metaplasia or H. Pylori.    He avoids NSAIDS due to worsening stomach pain.   If necessary will pretreat with gaviscon.     He has taken two doses of levsin since ER visit.     Review of Systems      Result Review :       CBC AND DIFFERENTIAL (01/21/2023 17:46)   LIPASE (01/21/2023 18:38)   COMPREHENSIVE METABOLIC PANEL (01/21/2023 18:38)   Tissue Pathology Exam (07/30/2021 07:29)   Tissue Pathology Exam (06/18/2021 07:55)     Vital Signs:   /84   Pulse 73   Temp 98 °F (36.7 °C)   Ht 170.2 cm (67\")   Wt 83.1 kg (183 lb 3.2 oz)   SpO2 97%   BMI 28.69 kg/m²     Body mass index is 28.69 kg/m².     Physical Exam  Vitals reviewed.   Constitutional:       General: He is not in acute distress.     Appearance: Normal appearance. He is not ill-appearing.   Eyes:      General: No scleral icterus.  Abdominal:      General: Bowel sounds are normal. There is no distension.      Palpations: Abdomen is soft. Abdomen is not rigid. There is no mass or pulsatile mass.      Tenderness: There is abdominal tenderness (right mid abdominal mild " tenderness with light palpatation). There is no guarding or rebound.   Lymphadenopathy:      Cervical: No cervical adenopathy.   Neurological:      Mental Status: He is alert.           Assessment and Plan    Diagnoses and all orders for this visit:    1. Epigastric pain (Primary)    2. Atypical chest pain    3. Infrequent bowel movements       Review ER records, continue pantoprazole   Discussed acid reflux, atypical chest pain, continue as needed Levsin    Proceed with EGD for further evaluation.  This will help determine future medication recommendations, dosages etc.    Our goal is lowest dose of acid medication possible to control symptoms.    Follow-up visit after EGD to review findings and make additional recommendations.    For infrequent bowel movements, recommend starting MiraLAX, adjusting dose to help promote more complete and regular evacuation, written instructions provided          Patient Instructions   For constipation, recommend starting MiraLAX.   This is available over-the-counter and generic brand is fine.  MiraLAX works best when taken on a regular basis (daily or every other day) to help promote more regular bowel movements.  Typically patients start with 1/2 -1 capful mixed in 8 ounces of noncarbonated liquid and take once or twice daily.    Maximum is 2 full capfuls daily.    Adjust dose based on your response.       Continue pantoprazole and as needed Levsin    Schedule EGD for further evaluation, orders placed.    Additional recommendations will be made based on EGD findings.           EMR Dragon/Transcription Disclaimer:  This document has been Dictated utilizing Dragon dictation.

## 2023-11-29 ENCOUNTER — OFFICE VISIT (OUTPATIENT)
Dept: ORTHOPEDIC SURGERY | Facility: CLINIC | Age: 33
End: 2023-11-29
Payer: COMMERCIAL

## 2023-11-29 VITALS — BODY MASS INDEX: 26.69 KG/M2 | WEIGHT: 176.1 LBS | HEIGHT: 68 IN | TEMPERATURE: 98.3 F

## 2023-11-29 DIAGNOSIS — M25.512 CHRONIC LEFT SHOULDER PAIN: ICD-10-CM

## 2023-11-29 DIAGNOSIS — M25.512 LEFT SHOULDER PAIN, UNSPECIFIED CHRONICITY: Primary | ICD-10-CM

## 2023-11-29 DIAGNOSIS — G89.29 CHRONIC LEFT SHOULDER PAIN: ICD-10-CM

## 2023-11-29 RX ORDER — METHYLPREDNISOLONE ACETATE 80 MG/ML
80 INJECTION, SUSPENSION INTRA-ARTICULAR; INTRALESIONAL; INTRAMUSCULAR; SOFT TISSUE
Status: COMPLETED | OUTPATIENT
Start: 2023-11-29 | End: 2023-11-29

## 2023-11-29 RX ORDER — LIDOCAINE HYDROCHLORIDE 10 MG/ML
2 INJECTION, SOLUTION EPIDURAL; INFILTRATION; INTRACAUDAL; PERINEURAL
Status: COMPLETED | OUTPATIENT
Start: 2023-11-29 | End: 2023-11-29

## 2023-11-29 RX ADMIN — LIDOCAINE HYDROCHLORIDE 2 ML: 10 INJECTION, SOLUTION EPIDURAL; INFILTRATION; INTRACAUDAL; PERINEURAL at 08:45

## 2023-11-29 RX ADMIN — METHYLPREDNISOLONE ACETATE 80 MG: 80 INJECTION, SUSPENSION INTRA-ARTICULAR; INTRALESIONAL; INTRAMUSCULAR; SOFT TISSUE at 08:45

## 2023-11-29 NOTE — PROGRESS NOTES
Chief Complaint: Left shoulder pain    HPI: Shalom is seen today for new complaint of left shoulder pain.  Interestingly, his right shoulder pain completely resolved after the last injection over a year ago.  Left shoulder has been bothering him over the past few months.  Denies any specific injury.  Says the symptoms are basically identical to those that he was having on the right side previously.  Localizes pain to the top of the shoulder.  It is worse with certain workout maneuvers such as dips.    Exam:  Left upper extremity: Skin is benign.  No gross abnormalities on inspection including any atrophy, swellings, or masses.  No palpable masses or adenopathy.  Focal tenderness noted over the acromioclavicular joint.  Full shoulder motion.  No evident instability or apprehension.  Positive active compression maneuver which reproduces the patient's typical pain.  Good strength in the rotator cuff, deltoid, biceps, triceps, and .  Intact sensation throughout the arm.  Brisk cap refill.     Imaging:  AP, scapular Y, and axillary views of the left shoulder are ordered by myself and reviewed to evaluate the patient's complaint.  No comparison films are immediately available.  The x-rays show no obvious acute abnormalities, lesions, masses, significant degenerative changes, or other concerning findings.  The acromiohumeral interval is normal.  Glenoid version appears normal as well.     Assessment: Left acromioclavicular synovitis    Plan: He is x-rays do not show any significant arthritis.  Nonetheless, his history and exam are both consistent with acromioclavicular joint irritation.  I suggest we try an injection.  The risk, benefits and alternatives were discussed but he consented and injection was performed as described below.  If his symptoms persist or recur, I told him to let me know and I will be happy to see him back.  Otherwise, he can follow-up as needed.    Jj Quan MD  11/29/2023    [facility]  Arthrocentesis: L acromioclavicular  Date/Time: 11/29/2023 8:45 AM  Consent given by: patient  Site marked: site marked  Timeout: Immediately prior to procedure a time out was called to verify the correct patient, procedure, equipment, support staff and site/side marked as required   Supporting Documentation  Indications: pain   Procedure Details  Location: shoulder - L acromioclavicular  Preparation: Patient was prepped and draped in the usual sterile fashion  Needle size: 25 G  Approach: superior  Medications administered: 2 mL lidocaine PF 1% 1 %; 80 mg methylPREDNISolone acetate 80 MG/ML  Patient tolerance: patient tolerated the procedure well with no immediate complications

## 2024-04-05 ENCOUNTER — E-VISIT (OUTPATIENT)
Dept: FAMILY MEDICINE CLINIC | Facility: TELEHEALTH | Age: 34
End: 2024-04-05
Payer: COMMERCIAL

## 2024-04-05 RX ORDER — PREDNISONE 10 MG/1
TABLET ORAL
Qty: 30 TABLET | Refills: 0 | Status: SHIPPED | OUTPATIENT
Start: 2024-04-05

## 2024-04-05 NOTE — E-VISIT TREATED
Chief Complaint: Cold, flu, COVID, sinus, hay fever, or seasonal allergies   Patient introduction   Patient is 34-year-old male with congestion, nasal discharge, voice hoarseness, headache, and fatigue that started 10 to 14 days ago.   COVID-19 testing history, vaccination status, and exposure:    Has not been tested for COVID-19 since symptom onset.    Patient was prompted to take a self-test during the interview, but does not have a COVID-19 test kit.    Has not received an updated 5029-2537 COVID-19 vaccination (Pfizer-BioNTech or Moderna vaccine after September 12, 2023; or Novavax vaccine after October 3, 2023).    No known exposure to a person with a confirmed or suspected case of COVID-19.    No high-risk (household) exposure to COVID-19 within the last 14 days.   Risk factors for severe disease from COVID-19 infection    Mostly sedentary lifestyle.   General presentation   Patient saw improvement in symptoms for less than 1 day, followed by worsening of symptoms. Symptoms came on gradually.   Fever:    No fever.   Sinus and nasal symptoms:    Nasal discharge.    Green nasal drainage.    Nasal drainage is thick.    Postnasal drip.    1 to 3 episodes of antibiotic treatment for sinus infection in the last year.    Sinus pain or pressure on or around the forehead, eyes, nose, cheeks, and upper teeth or jaw.    Patient first noticed sinus pain 10 to 14 days ago.    Sinus pain is worse with Valsalva.    No itchy nose or sneezing.    No history of unhealed nasal septal ulcer/nasal wound.    No history of deviated septum or nasal polyps.   Throat symptoms:    Voice is mildly hoarse. Patient does not believe hoarseness is due to voice strain.    No sore throat.   Head and body aches:    Headache described as moderate (4 to 6 on a scale of 1 to 10).    Fatigue.    No sweats.    No chills.    No myalgia.   Cough:    No cough.   Wheezing and shortness of breath:    No COPD diagnosis.    No asthma diagnosis.    No  "wheezing.    No shortness of breath.   Chest pain:    No chest pain.   Ear symptoms:    Current symptoms include pain, pressure, fullness, and crackling or popping in the ear(s).   Dizziness:    No dizziness.   Allergies:    Patient has known seasonal allergies.    Patient does not think symptoms are allergy-related.   Flu exposure:    No recent known exposure to a person with a confirmed flu diagnosis.    Has not had a flu vaccine this season.   Patient is taking over-the-counter medications for current symptoms, including acetaminophen, fluticasone, guaifenesin/dextromethorphan, and phenylephrine.   Review of red flags/alarm symptoms:    No changes in alertness or awareness.    No symptoms suggesting intracranial hemorrhage.    No decreased urination.    No blue or gray coloring present in face, lips, or nail beds.    No swelling, pain, redness, or increased warmth in the calf or lower part of ONE leg only.    No proptosis.   Risk factors for antibiotic resistance:    Close contact with a child in .   Self-exam:    Height: 5' 8\"    Weight: 160 lbs    Neck lymph nodes feel normal.   Recent antibiotic use:    Has not taken antibiotics for similar symptoms within the past month.   Current medications   Currently taking pantoprazole 40 MG EC tablet.   Medication allergies   None.   Medication contraindication review   No history of anaphylactic reaction to beta-lactam antibiotics; aspirin triad; blood dyscrasia; bone marrow depression; catecholamine-releasing paraganglioma; coronary artery disease; coagulation disorder; congenital long QT syndrome; depression; electrolyte abnormalities; fungal infection; GI bleeding; GI obstruction; G6PD deficiency; heart arrhythmia; hypertension; mononucleosis; myasthenia; recent myocardial infarction; NSAID-induced asthma/urticaria; Parkinson's disease; pheochromocytoma; porphyria; Reye syndrome; seizure disorder; ulcerative colitis; and urinary retention.   No history of " metoclopramide-associated dystonic reaction and tardive dyskinesia.   No known history of amoxicillin-clavulanate-associated cholestatic jaundice or hepatic impairment.   No known history of azithromycin-associated cholestatic jaundice or hepatic impairment.   Past medical history   Immune conditions:   No immunocompromising conditions.   No history of cancer.   Social history   Never smoked tobacco.   High-risk household contacts:    Household contact under the age of 5.   Patient did not request an excuse note.   Assessment   Bacterial sinusitis.   This is the likely diagnosis based on patient's interview responses, including:    Symptom profile    Duration of symptoms longer than 10 days    Sinus pressure lasting longer than 10 days    Symptoms improved then came back worse   Plan   Medications:    amoxicillin 875 mg-potassium clavulanate 125 mg tablet RX 875mg/125mg 1 tab PO bid 7d for infection. This medication is an antibiotic. Take it exactly as directed. You must finish the entire course of medication, even if you feel better after the first few days of treatment. Amount is 14 tab.    fluticasone 50 mcg/actuation nasal spray,suspension OTC 50mcg 2 sprays each nostril nasal qd 30d for nasal symptoms due to allergies or sinusitis. Fluticasone needs to be used every day to be effective. It may take up to a week for the full effects of the medication to be seen. Brands to look for include Flonase. Amount is 16 g.   The patient's prescription will be sent to:   [facility] [id number] [place], [place] [id number]   Phone: [phone]     Fax: [phone]   Patient informed to purchase OTC medication.   Education:    Condition and causes    Prevention    Treatment and self-care    When to call provider   ----------   Electronically signed by KRYSTINA Brannon FNP-BC on 2024-04-05 at 06:11AM   ----------   Patient Interview Transcript:   Which of these symptoms are bothering you? Select  all that apply.    Stuffed-up nose or sinuses    Runny nose    Hoarse voice or loss of voice    Headache    Fatigue or tiredness   Not selected:    Cough    Shortness of breath    Itchy or watery eyes    Itchy nose or sneezing    Loss of smell or taste    Sore throat    Fever    Sweats    Chills    Muscle or body aches    Nausea or vomiting    Diarrhea    I don't have any of these symptoms   When did your current symptoms start? Select one.    10 to 14 days ago   Not selected:    Less than 48 hours ago    3 to 5 days ago    6 to 9 days ago    2 to 3 weeks ago    3 to 4 weeks ago    More than a month ago   Did your symptoms come on suddenly or gradually? Select one.    Gradually   Not selected:    Suddenly    I'm not sure   Have your symptoms improved at all since they began? Select one.    Yes, but then they came back worse than before   Not selected:    Yes, but they haven't gone away completely    No   It sounds like you felt better, but now you feel sick again. How long did you feel better? Select one.    Less than 1 day   Not selected:    1 to 2 days    More than 2 days   Since your current symptoms started, have you been tested for COVID-19? This includes home self-tests as well as nose swab or saliva tests done at a doctor's office, lab, or testing site. Select one.    No   Not selected:    Yes   Taking a home COVID test can help your provider give you the best care. - If you have a COVID test kit, take the test now before continuing this interview. - If you choose not to take a test or don't have one, you should still continue this interview. Your provider can still help you get care. Do you have a COVID test kit? Select one.    No, I don't have a test kit   Not selected:    Yes, and I'll take a test now    Yes, but I prefer not to take a test now   Has anyone in your household tested positive for COVID-19 in the past 14 days? Select one.    No   Not selected:    Yes   In the last 14 days, have you had close  "contact with someone who has COVID-19? \"Close contact\" means any of these: - Caring for someone with COVID-19. - Being within 6 feet of someone with COVID-19 for a total of at least 15 minutes over a 24-hour period. For example, three 5-minute exposures for a total of 15 minutes. - Being in direct contact with respiratory droplets from someone with COVID-19 (being coughed on, kissing, sharing utensils). Select one.    No, not that I know of   Not selected:    Yes, a confirmed case    Yes, a suspected case   Have you gotten the [id number] updated COVID-19 vaccine? This means either the updated Pfizer-BioNTech or Moderna vaccine after September 12, 2023; or the updated Novavax vaccine after October 3, 2023. Select one.    No   Not selected:    Yes   Since your symptoms started, have you felt dizzy? Select one.    No   Not selected:    Yes, but I can still do my regular daily activities    Yes, and it makes it hard to stand, walk, or do daily activities   Do you have chest pain? You might also feel it as discomfort, aching, tightness, or squeezing in the chest. Select one.    No   Not selected:    Yes   You mentioned having a headache. On a scale of 1 to 10, how severe is your headache pain? Select one.    Moderate (4 to 6)   Not selected:    Mild (1 to 3)    Severe (7 to 9)    Unbearable (10)    The worst headache of my life (10+)   Do you feel sinus pain or pressure in any of these areas?    In my forehead    Around my eyes    Behind my nose    In my cheeks    In my upper teeth or jaw   Not selected:    No   When did you first notice your sinus pain or pressure? Select one.    10 to 14 days ago   Not selected:    Less than 5 days ago    5 to 9 days ago    2 to 4 weeks ago    1 month ago or longer   Does coughing, sneezing, or leaning forward make your sinuses feel worse? Select one.    Yes   Not selected:    No   What color is your nasal drainage? Select one.    Green or greenish   Not selected:    Clear    White   " " Yellow or yellowish    My nose is stuffed but not draining or running   Is your nasal drainage thick or thin? Select one.    Thick   Not selected:    Thin   Is there any drainage (mucus) going down the back of your throat? This kind of drainage is also called \"postnasal drip.\" It can cause frequent throat clearing. Select one.    Yes   Not selected:    No, not that I know of   Have you urinated at least 3 times in the last 24 hours? Select one.    Yes   Not selected:    No   Do your face, lips, or nail beds appear blue or gray? Select one.    No   Not selected:    Yes   Do you have any swelling, pain, redness, or increased warmth in the calf or lower part of ONE leg only? Select one.    No   Not selected:    Yes   Changes in alertness or awareness may mean you need emergency care. Since your symptoms started, have you had any of these? Select all that apply.    None of the above   Not selected:    Confusion    Slurred speech    Not knowing where you are or what day it is    Difficulty staying conscious    Fainting or passing out   Do your symptoms include a whistling sound, or wheezing, when you breathe? Select one.    No   Not selected:    Yes   Early in this interview, you told us you were hoarse or you'd lost your voice. How would you describe the changes to your voice? Select one.    It just sounds a little raspy   Not selected:    It's harder than usual to talk    I can barely talk at all   Is it possible that you strained your voice? Singing, yelling, or talking more or louder than usual can cause voice strain. Select one.    No, not that I know of   Not selected:    Yes   Since your symptoms started, have you noticed that one or both of your eyes is bulging or poking out? Select one.    No   Not selected:    Yes   Do you have any of these symptoms in your ear(s)? Select all that apply.    Pain    Pressure    Fullness    Crackling or popping   Not selected:    Plugged or blocked sensation    None of the " above   Are your glands/lymph nodes swollen, or does it hurt when you touch them?    No, not that I can tell   Not selected:    Yes   In the past week, has anyone around you (such as at school, work, or home) had a confirmed diagnosis of the flu? A confirmed diagnosis means that a nose swab was done to verify a flu infection. Select all that apply.    No, not that I know of   Not selected:    I live with someone who has the flu    I've been within touching distance of someone who has the flu    I've walked by, or sat about 3 feet away from, someone who has the flu    I've been in the same building as someone who has the flu   Have you ever been diagnosed with asthma? Select one.    No   Not selected:    Yes   Have you ever been diagnosed with chronic obstructive pulmonary disease (COPD)? Select one.    No, not that I know of   Not selected:    Yes   In the past month, have you taken antibiotics for similar symptoms? Examples of antibiotics include amoxicillin, amoxicillin-clavulanate (Augmentin), penicillin, cefdinir (Omnicef), doxycycline, and clindamycin (Cleocin). Select one.    No   Not selected:    Yes (specify)   In the last year, how many times were you treated with antibiotics for a sinus infection? Select one.    1 to 3 times   Not selected:    None    4 or more times   Do any of these apply to you? Select all that apply.    I'm in close contact with a child in    Not selected:    I've been hospitalized within the last 5 days    I have diabetes    None of the above   Have you been diagnosed with a deviated septum or nasal polyps? The nose is divided into two nostrils by the septum. A crooked septum is called a deviated septum. Nasal polyps are growths inside the nose or sinuses. Select one.    No, not that I know of   Not selected:    Yes, but I had surgery to treat them    Yes, I have a deviated septum    Yes, I have nasal polyps    Yes, I have a deviated septum and nasal polyps   Do you have a sore  inside your nose that won't heal? Select one.    No, not that I know of   Not selected:    Yes   Do you have allergies (pollen, dust mites, mold, animal dander)? Select one.    Yes   Not selected:    No, not that I know of   What kind of allergies do you have? Select all that apply.    Seasonal allergies (hay fever)   Not selected:    Perennial, or year-round, allergies (hay fever)    Pet allergies    Dust allergies    None of the above    I'm not sure   Do you think your symptoms could be allergy-related? Select one.    No   Not selected:    Yes    I'm not sure   Have you had a flu shot this season? Select one.    No   Not selected:    Yes, less than 2 weeks ago    Yes, 2 to 4 weeks ago    Yes, 1 to 3 months ago    Yes, 3 to 6 months ago    Yes, more than 6 months ago   The flu and COVID-19 can be more serious for people in certain groups. The next few questions help us figure out if you or anyone you live with is at higher risk for complications from these infections. Do any of these statements apply to you? Select all that apply.    None of the above   Not selected:    I'm     I'm     I'm Black    I'm  or    Do you smoke tobacco? Select one.    No   Not selected:    Yes, every day    Yes, some days    No, I quit   Do you have a mostly inactive lifestyle? Answer yes if all of these are true: - You spend at least 6 hours a day sitting or lying down - You get less than 2 and a half hours per week of moderate exercise such as walking fast - You get less than 1 hour and 15 minutes per week of intense exercise such as jogging or running Select one.    Yes   Not selected:    No   Do you have any of these conditions? Select all that apply.    None of the above   Not selected:    Chronic lung disease, such as cystic fibrosis or interstitial fibrosis    Heart disease, such as congenital heart disease, congestive heart failure, or coronary artery disease    Disorder of the brain,  spinal cord, or nerves and muscles, such as dementia, cerebral palsy, epilepsy, muscular dystrophy, or developmental delay    Metabolic disorder or mitochondrial disease    Cerebrovascular disease, such as stroke or another condition affecting the blood vessels or blood supply to the brain    Down syndrome    Mood disorder, including depression or schizophrenia spectrum disorders    Substance use disorder, such as alcohol, opioid, or cocaine use disorder    Tuberculosis    Primary immunodeficiency   Do you live in a group care setting? Examples include: - Nursing home - Residential care - Psychiatric treatment facility - [facility] - Homeless shelter - Foster care setting Select one.    No   Not selected:    Yes   Are you a healthcare worker? Select one.    No   Not selected:    Yes   People with a very high body mass index (BMI) are at higher risk for developing complications from the flu and severe illness from COVID-19. To determine your BMI, we need to know your weight and height. Please enter your weight (in pounds).    Weight   Please enter your height.    Height   Do you have any of these conditions that can affect the immune system? Scroll to see all options. Select all that apply.    None of these   Not selected:    History of bone marrow transplant    Chronic kidney disease    Chronic liver disease (including cirrhosis)    HIV/AIDS    Inflammatory bowel disease (Crohn's disease or ulcerative colitis)    Lupus    Moderate to severe plaque psoriasis    Multiple sclerosis    Rheumatoid arthritis    Sickle cell anemia    Alpha or beta thalassemia    History of kidney, liver, heart, or other solid organ transplant    History of liver, heart, or other solid organ transplant    History of spleen removal    An autoimmune disorder not listed here (specify)    A condition requiring treatment with long-term use of oral steroids (such as prednisone, prednisolone, or dexamethasone)  (specify)   Have you ever been diagnosed with cancer? Select one.    No   Not selected:    Yes, I have cancer now    Yes, but I'm in remission   The flu and COVID-19 can be more serious for people in certain groups. Do any of these apply to the people who live with you? Select all that apply.    Under age 5   Not selected:    Over age 65            Black     or     Pregnant    Has given birth, had a miscarriage, had a pregnancy loss, or had an  in the last 2 weeks    None of the above   Does any member of your household have any of these medical conditions? Select all that apply.    None of the above   Not selected:    Asthma    Disorders of the brain, spinal cord, or nerves and muscles, such as dementia, cerebral palsy, epilepsy, muscular dystrophy, or developmental delay    Chronic lung disease, such as COPD or cystic fibrosis    Heart disease, such as congenital heart disease, congestive heart failure, or coronary artery disease    Cerebrovascular disease, such as stroke or another condition affecting the blood vessels or blood supply to the brain    Blood disorders, such as sickle cell disease    Diabetes    Metabolic disorders such as inherited metabolic disorders or mitochondrial disease    Kidney disorders    Liver disorders    Weakened immune system due to illness or medications such as chemotherapy or steroids    Children under the age of 19 who are on long-term aspirin therapy    Extreme obesity (BMI > 40)   Do you have any of these conditions? Scroll to see all options. Select all that apply.    None of the above   Not selected:    Aspirin triad (also known as Samter's triad or ASA triad)    Asthma or hives from taking aspirin or other NSAIDs, such as ibuprofen or naproxen    Blockage or narrowing of the blood vessels of the heart    Blood clotting disorder    Blood dyscrasia, such anemia, leukemia, lymphoma, or myeloma    Bone marrow depression     Catecholamine-releasing paraganglioma    Congenital long QT syndrome    Depression    Difficulty urinating or completely emptying your bladder    Uncorrected electrolyte abnormalities    Fungal infection    Gastrointestinal (GI) bleeding    Gastrointestinal (GI) obstruction    G6PD deficiency    Recent heart attack    High blood pressure    Irregular heartbeat or heart rhythm    Mononucleosis (mono)    Myasthenia gravis    Parkinson's disease    Pheochromocytoma    Reye syndrome    Seizure disorder    Thyroid disease    Ulcerative colitis   Have you ever had either of these conditions? Select all that apply.    No   Not selected:    Metoclopramide-associated dystonic reaction    Tardive dyskinesia   Just a few more questions about medications, and then you're finished. Have you used any non-prescription medications or nasal sprays for your current symptoms? Examples include saline sprays, decongestants, NyQuil, and Tylenol. Select one.    Yes   Not selected:    No   Which of these non-prescription medications have you tried? Scroll to see all options. Select all that apply.    Acetaminophen (Tylenol)    Fluticasone (Flonase)    Guaifenesin/dextromethorphan (Delsym DM, Mucinex DM, Robitussin DM)    Phenylephrine (Sudafed PE)   Not selected:    Budesonide (Rhinocort)    Cetirizine (Zyrtec)    Chlorpheniramine (Aller-chlor, Chlor-Trimeton)    Cromolyn (NasalCrom)    Dextromethorphan (Delsym, Robitussin, Vicks DayQuil Cough)    Diphenhydramine (Benadryl)    Fexofenadine (Allegra)    Guaifenesin (Mucinex)    Ibuprofen (Advil, Motrin, Midol)    Ketotifen (Alaway, Zaditor)    Loratadine (Alavert, Claritin)    Naphazoline-pheniramine (Naphcon-A, Opcon-A, Visine-A)    Omeprazole (Prilosec)    Oxymetazoline (Afrin)    Triamcinolone (Nasacort)    None of the above   Have you taken any monoamine oxidase inhibitor (MAOI) medications in the last 14 days? Examples include rasagiline (Azilect), selegiline (Eldepryl, Zelapar),  isocarboxazid (Marplan), phenelzine (Nardil), and tranylcypromine (Parnate). Select one.    No, not that I know of   Not selected:    Yes   Do you take Kynmobi or Apokyn (apomorphine)? Select one.    No   Not selected:    Yes   Are you still taking these medications listed in your medical record? If you're not taking any of these, click Next. Select all that apply.    pantoprazole 40 MG EC tablet   Are you taking any other medications, vitamins, or supplements? Select one.    No   Not selected:    Yes   Have you ever had an allergic or bad reaction to any medication? Select one.    No   Not selected:    Yes   Are you allergic to milk or to the proteins found in milk (for example, whey or casein)? A milk allergy is different from lactose intolerance. Select one.    No, not that I know of   Not selected:    Yes   Have you ever had jaundice or liver problems as a result of taking amoxicillin-clavulanate (Augmentin)? Jaundice is a condition in which the skin and the whites of the eyes turn yellow. Select all that apply.    No, not that I know of   Not selected:    Yes, jaundice    Yes, liver problems   Have you ever had jaundice or liver problems as a result of taking azithromycin (Zithromax, Zmax)? Jaundice is a condition in which the skin and the whites of the eyes turn yellow. Select all that apply.    No, not that I know of   Not selected:    Yes, jaundice    Yes, liver problems   Do you need a doctor's note? A doctor's note confirms that you received care today and states when you can return to school or work. It does not contain information about your diagnosis or treatment plan. Your provider will make the final decision on whether to give you a doctor's note and for how long. Doctor's notes CANNOT be backdated. We can't provide medical leave paperwork through this type of visit. If more paperwork is needed to request time off, contact your primary care provider. Select one.    No   Not selected:    Today only (1  day)    Today and tomorrow (2 days)    3 days    5 days    7 days   Is there anything you'd like to add about your symptoms? Please limit your comments to the symptoms covered in this interview. If you include comments about other concerns, your provider may recommend that you be seen in person.   The patient did not enter any additional information.   ----------   Medical history   Medical history data does not currently exist for this patient.

## 2024-04-05 NOTE — EXTERNAL PATIENT INSTRUCTIONS
Note   I have prescribed Augmentin, Flonase and Prednisone. If your symptoms do not improve or become worse, follow up with your doctor.   Diagnosis   Bacterial sinusitis   My name is KRYSTINA Brannon FNP-BC, and I'm a healthcare provider at [facility]. I reviewed your interview, and I see that you have bacterial sinusitis, also known as a bacterial sinus infection.   Medications   Your pharmacy   [facility] [id number] [facility] [id number] [phone]     Prescription   Amoxicillin-clavulanate (875mg/125mg): Take 1 tablet by mouth twice a day for 7 days for infection. This medication is an antibiotic. Take it exactly as directed. You must finish the entire course of medication, even if you feel better after the first few days of treatment.    Start taking the antibiotics I've prescribed right away. You need to finish the entire course of antibiotics, even if you start to feel better before the pills run out.   Non-prescription   Fluticasone (50mcg): Spray 2 times in each nostril once daily for nasal symptoms due to allergies or sinusitis. Fluticasone needs to be used every day to be effective. It may take up to a week for the full effects of the medication to be seen. Brands to look for include Flonase.   About your diagnosis   The sinuses are hollow spaces connected to the nasal passages. Sinusitis occurs when the sinuses swell and block the drainage of fluid and mucus from the nose, causing pain, pressure, and congestion. Fatigue, difficulty sleeping, or decreased appetite may accompany your symptoms.   More than 90% of sinus infections are caused by viruses. However, in certain cases, a sinus infection may be caused by bacteria. Bacterial sinus infections usually look like one of the following cases:    Severe sinus symptoms with a fever over 102F.    Sinus symptoms that have not improved at all after 10 days.    Cold symptoms that slowly improve but then worsen again after  5 or 6 days, usually with a high fever, headache, or nasal discharge.   What to expect   If you follow this treatment plan, you should start to feel better within a few days.   When to seek care   Call us at [phone]   with any sudden or unexpected symptoms.    Symptoms that last longer than 10 to 14 days.    Symptoms that get better for a few days, and then suddenly get worse.    Fever that measures over 103F or continues for more than 3 days.    Any vision changes.    Your headache worsens.    Stiff neck.    Swelling of your forehead or eyes.    Coughing up red or bloody mucus.    Swallowing becomes extremely difficult or impossible.    More than 5 episodes of diarrhea in a day.    More than 5 episodes of vomiting in a day.    Severe shortness of breath.    Severe chest pain   Other treatment    Rest! Your body needs rest to recover and fight infection.    Drink plenty of water to stay hydrated.    Use steam to soothe your sinuses: Breathe it in from a shower or a bowl of hot water. Placing a warm, moist washcloth over your nose and forehead may help relieve the sinus pain and pressure.    Try non-prescription saline nasal sprays to help your nasal symptoms. Try using a Neti Pot to flush out your stuffy nose and sinuses. Neti Pots are available at any drugstore without a prescription.    Avoid smoke and air pollution. Smoke can make infections worse.   Prevention    Avoid close contact with other people when you're sick.    Cover your mouth and nose when you cough or sneeze. Use a tissue or cough into your elbow. Make sure that used tissues go directly into the trash.    Avoid touching your eyes, nose, or mouth while you're sick.    Wash your hands often, especially after coughing, sneezing, or blowing your nose. If soap and water are not available, use an alcohol-based hand .    If you or someone in your home or workplace is sick, disinfect commonly used items. This includes door handles, tables,  computers, remotes, and pens.    Coronavirus (COVID-19) information   Common symptoms of COVID-19 include fever, cough, shortness of breath, fatigue, muscle or body aches, headaches, new loss of sense of taste or smell, sore throat, stuffy or runny nose, nausea or vomiting, and diarrhea. Most people who get COVID-19 have mild symptoms and can rest at home until they get better. Elderly people and those with chronic medical problems may be at risk for more serious complications.   FAQs about the COVID-19 vaccine   Are the vaccines safe?   Yes. Hundreds of millions of people in the [place] have already safely received COVID-19 vaccines under the most intense safety monitoring in the history of the [place].   Do I need the vaccine if I've already had COVID?   Yes. Vaccination helps protect you even if you've already had COVID.   If you had COVID-19 and had symptoms, wait to get vaccinated until you've recovered and completed your isolation period.   If you tested positive for COVID-19 but did not have symptoms, you can get vaccinated after 5 full days have passed since you had a positive test, as long as you don't develop symptoms.   How many doses of the vaccine do I need?   Visit www.cdc.gov/coronavirus/2019-ncov/vaccines/stay-up-to-date.html   to find out how to stay up to date with your COVID-19 vaccines.   I'm immunocompromised. How many doses of the vaccine do I need?   For information on how immunocompromised people can stay up to date with their COVID-19 vaccines, visit www.cdc.gov/coronavirus/2019-ncov/vaccines/recommendations/immuno.html  .   What are the common side effects of the vaccine?   A sore arm, tiredness, headache, and muscle pain may occur within two days of getting the vaccine and last a day or two. For the Moderna or Pfizer vaccines, side effects are more common after the second dose. People over the age of 55 are less likely to have side effects than younger people.   After I'm up to date on vaccines, can  I still get or spread COVID?   Yes, you can still get COVID, but your disease should be milder. And your risk of serious illness, hospitalization, and complications will be much lower, especially if you're up to date. Unfortunately, you can still spread COVID if you've been vaccinated. That's why it's important to follow isolation guidelines if you get sick or test positive.   After I'm up to date on vaccines, can I go back to normal?   You should still wear a mask indoors in public if:    It's required by laws, rules, regulations, or local guidance.    You have a weakened immune system.    Your age puts you at increased risk of severe disease.    You have a medical condition that puts you at increased risk of severe disease.    Someone in your household has a weakened immune system, is at increased risk for severe disease, or is unvaccinated.    You're in an area of high transmission.   Where can I get a COVID-19 vaccine?   Visit [facility]'s website for more information. To find a COVID-19 vaccination site near you, visit www.vaccines.gov/  , call [phone]  , or text your zip code to 419517 ([employer]). Message and data rates may apply.   I've had close contact with someone who has COVID. Do I need to quarantine, and if so, for how long?   For the most current answer, including a calculator to determine whether you need to stay home and for how long, visit www.cdc.gov/coronavirus/2019-ncov/your-health/isolation.html  .   I've tested positive for COVID. How long do I need to isolate?   For the latest recommendations, including a calculator to determine how long you need to stay home, visit www.cdc.gov/coronavirus/2019-ncov/your-health/isolation.html  .   What if I develop symptoms that might be from COVID?   For the latest recommendations on what to do if you're sick, including when to seek emergency care, visit www.cdc.gov/coronavirus/2019-ncov/if-you-are-sick/index.html  .    Flu vaccine information   Who  should get a flu vaccine?   Everyone 6 months of age and older should get a yearly flu vaccine.   When should I get vaccinated?   You should get a flu vaccine by the end of October. Once you're vaccinated, it takes about two weeks for antibodies to develop and protect you against the flu. That's why it's important to get vaccinated as soon as possible.   After October, is it too late to get vaccinated?   No. You should still get vaccinated. As long as the flu viruses are still in your community, flu vaccines will remain available, even into January of next year or later.   Why do I need a flu vaccine EVERY year?   Flu viruses are constantly changing, so flu vaccines are usually updated from one season to the next. Your protection from the flu vaccine also lessens over time.   Is the flu vaccine safe?   Yes. Over the last 50 years, hundreds of millions of Americans have safely received the flu vaccines.   What are the side effects of flu vaccines?   You CANNOT get the flu from a flu vaccine. Common side effects of the flu shot include soreness, redness and/or swelling where the shot was given, low grade fever, and aches. Common side effects of the nasal spray flu vaccine for adults include runny nose, headaches, sore throat, and cough. For children, side effects include wheezing, vomiting, muscle aches, and fever.   Does the flu vaccine increase your risk of getting COVID-19?   No. There is no evidence that getting a flu vaccine increases your risk of getting COVID-19.   Is it safe to get the flu vaccine along with a COVID-19 vaccine?   Yes. It's safe to get the flu vaccine with a COVID-19 vaccine or booster.   Contact your healthcare provider TODAY for details on when and where to get your flu vaccine.   Your provider   Your diagnosis was provided by KRYSTINA Brannon FNP-BC, a member of your trusted care team at [facility].   If you have any questions, call us at [phone]  .

## 2024-04-15 NOTE — PATIENT INSTRUCTIONS
For constipation, recommend starting MiraLAX.   This is available over-the-counter and generic brand is fine.  MiraLAX works best when taken on a regular basis (daily or every other day) to help promote more regular bowel movements.  Typically patients start with 1/2 -1 capful mixed in 8 ounces of noncarbonated liquid and take once or twice daily.    Maximum is 2 full capfuls daily.    Adjust dose based on your response.       Continue pantoprazole and as needed Levsin    Schedule EGD for further evaluation, orders placed.    Additional recommendations will be made based on EGD findings.    Opt out

## 2024-04-28 DIAGNOSIS — R10.13 EPIGASTRIC PAIN: ICD-10-CM

## 2024-04-28 DIAGNOSIS — K21.9 GASTROESOPHAGEAL REFLUX DISEASE WITHOUT ESOPHAGITIS: ICD-10-CM

## 2024-04-28 RX ORDER — PANTOPRAZOLE SODIUM 40 MG/1
40 TABLET, DELAYED RELEASE ORAL DAILY
Qty: 90 TABLET | Refills: 3 | Status: SHIPPED | OUTPATIENT
Start: 2024-04-28

## 2024-11-27 ENCOUNTER — E-VISIT (OUTPATIENT)
Dept: FAMILY MEDICINE CLINIC | Facility: TELEHEALTH | Age: 34
End: 2024-11-27
Payer: COMMERCIAL

## 2024-11-27 PROCEDURE — FABRICHEALTHVISIT: Performed by: NURSE PRACTITIONER

## 2024-11-27 RX ORDER — BENZONATATE 200 MG/1
200 CAPSULE ORAL 3 TIMES DAILY PRN
Start: 2024-11-27 | End: 2024-12-02

## 2024-11-27 NOTE — E-VISIT TREATED
Date: 2024 04:54:16  Clinician: Ying Thompson  Clinician NPI: [id number]  Patient: Shalom Luna  Patient : 1990  Patient Address: [address] [place] [address]  Patient Phone: [phone]  Visit Protocol: URI  Patient Summary:  Shalom is a 34 year old ( : 1990 ) male who initiated a visit for cold, sinus infection, or influenza.     Shalom states the symptoms started gradually 10-13 days ago. After the symptoms started, they improved and then got   worse again.   Symptom start date: 2024   The symptoms consist of rhinitis, chills, a cough, malaise, a sore throat, and a headache.   Symptom details     Nasal secretions: The color of the mucus is green and yellow.    Cough: Shalom coughs every 5-10 minutes and the cough is more bothersome at night. Phlegm comes into the throat when coughing. Shalom does not believe the cough is caused by post-nasal drip. The color of the phlegm is green and yellow.     Sore throat: Shalom reports having moderate throat pain (4-6 on a 10 point pain scale), does not have exudate on the tonsils, and can swallow liquids without any difficulty. Shalom is not sure if the lymph nodes in the neck are enlarged. A rash has not   appeared on the skin since the sore throat started.     Headache: The headache is mild (1-3 on a 10 point pain scale).      Shalom denies having anosmia and ageusia, ear pain, vomiting, diarrhea, wheezing, fever, facial pain or pressure, myalgias, nasal congestion, nausea, and teeth pain. Shalom also denies taking antibiotic medication in the past month and having recent   facial or sinus surgery in the past 60 days. Shalom is not experiencing dyspnea.   Precipitating events  Shalom is not sure if they have been exposed to someone with strep throat. Shalom has not recently been exposed to someone with influenza. Shalom has   not been in close contact with any high risk individuals.    Shalom has not received the influenza  vaccination.   Pertinent COVID-19 (Coronavirus) information  Since the symptoms started, Shalom has not tested for COVID-19.   Shalom has not had COVID-19   in the last 3 months.   Shalom has not received a COVID-19 vaccine in the past year.   Pertinent medical history     Shalom had 1 sinus infection within the past year.   A provider has told Shalom to avoid NSAIDs.   Shalom does not have diabetes. Shalom   denies having immunosuppressive conditions (e.g., chemotherapy, HIV, organ transplant, long-term use of steroids or other immunosuppressive medications, splenectomy). Shalom does not have asthma.   Shalom denies having chronic lung disease, cystic   fibrosis, hypertension, long-term disabilities, mental health conditions, sickle cell disease or thalassemia, stroke or other cardiovascular disease, substance use disorders, or tuberculosis (TB).  Shalom does not smoke or use smokeless tobacco. Shalom   does not vape or use other e-cigarette products.   Additional information as reported by the patient (free text): Daughter and wife have been diagnosed with atypical pneumonia in the last month.   Weight: 163 lbs (73.94 kg)    MEDICATIONS: pantoprazole oral, ALLERGIES: NKDA  Clinician Response:  Dear Shalom,  Based on the information provided, you have acute bacterial sinusitis, also known as a sinus infection. Sinus infections are caused by bacteria or a virus and symptoms are almost always identical. The difference between   the 2 types of infections is timing.  Sinus infections start as viral infections and symptoms improve on their own in about 7 days. A bacterial infection may have developed if any of the following apply to you:     You have had 7 days of symptoms and are experiencing at least 2 of the following:       Fever    Facial pressure or headache    Green or yellow nasal mucus    Symptoms that improved and then got worse again       You have had symptoms for 10 or more days     Medication information   I am prescribing:       Amoxicillin-pot clavulanate 875-125 mg oral tablet. Take 1 tablet by mouth every 12 hours for 10 days. There are no refills with this prescription.      Benzonatate 200 mg oral capsule. Take 1 capsule 3 times a day as needed for cough. There are no refills with this prescription.     Unless you are allergic to the over-the-counter medication(s) below, I recommend using:     Acetaminophen (Tylenol or store brand) oral tablet. Take 1-2 tablets by mouth every 4-6 hours to help with the discomfort.   Over-the-counter medications do not require a prescription. Ask the pharmacist if you have any questions.  Self care  Steps you   can take to be as comfortable as possible:     Rest.    Drink plenty of fluids.    Take a warm shower to loosen congestion.    Use a cool-mist humidifier.    Use throat lozenges.    Suck on frozen items such as popsicles.    Drink hot tea with lemon and honey.    Gargle with warm salt water (1/4 teaspoon of salt per 8 ounce glass of water).    Take a spoonful of honey to reduce your cough.     When to seek care  Please be seen in a clinic or urgent care if any of the following occur:     New symptoms develop, or symptoms become worse    Symptoms do not start to improve after 3 days of treatment     Call 911 or go to the emergency room if any of the following occur:     Difficulty breathing    If you feel that your throat is closing off    Suddenly develop a rash    Unable to swallow fluids or are drooling     It is possible to have an allergic reaction to an antibiotic even if you have not had one in the past. If you notice a new rash, significant swelling, or difficulty breathing, stop taking this medication immediately and go to a clinic or urgent care.    For the latest updates on COVID-19 (Coronavirus), please visit the Centers for Disease Control and Prevention (CDC). Also, your state and local health department websites may provide additional guidance regarding  testing and isolation recommendations for   your location.   Diagnosis: Acute bacterial sinusitis  Diagnosis ICD: J01.90    Follow up instructions:  ATTENTION: If you have been prescribed medications, your prescriptions will not be sent until you choose your pharmacy. To do so open the link within your notification, or go to [facility] and click eVisit in the menu to open your   treatment plan. From there, you can select your pharmacy at the bottom of your after visit summary. You can also go to [email or web address]   Additional Clinician Notes:  rest  fluids  PCP if symptoms persist   ER for any worsening symptoms such as high fever, chest pain or shortness of air    Prescriptions  Prescription: amoxicillin-pot clavulanate 875-125 mg oral tablet, take 1 tablet by mouth every 12 hours for 10 days  Sent To: [id number] - [phone] - [address] [place] [address]  Prescription: benzonatate 200 mg oral capsule, take 1 capsule 3 times a day as needed for cough  Sent To: [id number] - [phone] - [address] [id number]

## 2025-02-10 DIAGNOSIS — K21.9 GASTROESOPHAGEAL REFLUX DISEASE WITHOUT ESOPHAGITIS: ICD-10-CM

## 2025-02-10 DIAGNOSIS — R10.13 EPIGASTRIC PAIN: ICD-10-CM

## 2025-02-10 RX ORDER — PANTOPRAZOLE SODIUM 40 MG/1
40 TABLET, DELAYED RELEASE ORAL DAILY
Qty: 30 TABLET | Refills: 0 | Status: SHIPPED | OUTPATIENT
Start: 2025-02-10

## 2025-02-10 NOTE — TELEPHONE ENCOUNTER
Patient requesting refill request for Pantoprazole 40 mg once daily.     Patient is scheduled with APRN on 3/6/2025.

## 2025-03-06 ENCOUNTER — TELEPHONE (OUTPATIENT)
Dept: GASTROENTEROLOGY | Facility: CLINIC | Age: 35
End: 2025-03-06

## 2025-03-06 ENCOUNTER — OFFICE VISIT (OUTPATIENT)
Dept: GASTROENTEROLOGY | Facility: CLINIC | Age: 35
End: 2025-03-06
Payer: COMMERCIAL

## 2025-03-06 VITALS
DIASTOLIC BLOOD PRESSURE: 82 MMHG | WEIGHT: 171 LBS | BODY MASS INDEX: 25.91 KG/M2 | HEIGHT: 68 IN | SYSTOLIC BLOOD PRESSURE: 122 MMHG

## 2025-03-06 DIAGNOSIS — K21.9 GASTROESOPHAGEAL REFLUX DISEASE WITHOUT ESOPHAGITIS: ICD-10-CM

## 2025-03-06 DIAGNOSIS — K58.1 IRRITABLE BOWEL SYNDROME WITH CONSTIPATION: Primary | ICD-10-CM

## 2025-03-06 RX ORDER — HYDROCORTISONE 25 MG/G
CREAM TOPICAL 2 TIMES DAILY PRN
Qty: 30 G | Refills: 0 | Status: SHIPPED | OUTPATIENT
Start: 2025-03-06

## 2025-03-06 RX ORDER — PROPRANOLOL HYDROCHLORIDE 40 MG/1
20-40 TABLET ORAL AS NEEDED
COMMUNITY
Start: 2025-03-04

## 2025-03-06 RX ORDER — PLECANATIDE 3 MG/1
1 TABLET ORAL DAILY
Qty: 30 TABLET | Refills: 11 | Status: SHIPPED | OUTPATIENT
Start: 2025-03-06 | End: 2025-03-10 | Stop reason: SDUPTHER

## 2025-03-06 RX ORDER — PANTOPRAZOLE SODIUM 40 MG/1
40 TABLET, DELAYED RELEASE ORAL DAILY
Qty: 90 TABLET | Refills: 3 | Status: SHIPPED | OUTPATIENT
Start: 2025-03-06

## 2025-03-06 NOTE — PROGRESS NOTES
PATIENT INFORMATION  Shalom Luna       - 1990    CHIEF COMPLAINT  Chief Complaint   Patient presents with    Heartburn       HISTORY OF PRESENT ILLNESS  Here today to establish care for GERD    GERD: Managed on 40 mg omeprazole BID. He has triggers that he knows to avoid, but on average week no issues. Reviewed PRN AA with trigger foods/beverages.  No dysphagia, chest pain, etc.   No NSAIDS because they cause burning. Non Smoker.    Bowels are slow and can skip up to 4-5 days and then will feel bad and bloated and then mixed results whether has hard or liquid stools. No rectal bleeding or discomfort. Hemorrhoids are uncomfortable at night. Has tried prep H. Constipation has been a life long issue. Every few weeks will take senna for relief. Patient has tried trulance before with good results. We reviewed fiber, fluids and exercise. He is very active and drinks plenty of fluids.    Hx Esophageal ulcers and reflux in .  Dx with esophageal spasm in ER .  2021 Cholecystectomy.  2021 EGD with mild chronic gastritis, normal duodenal bx.  2023 EGD normal  Both parents with polyps.         REVIEWED PERTINENT RESULTS/ LABS  Lab Results   Component Value Date    CASEREPORT  2023     Surgical Pathology Report                         Case: [id number]                                  Authorizing Provider:  Adalberto Bernard MD    Collected:           2023 08:50 AM          Ordering Location:     [facility]     Received:            2023 09:36 AM                                 [facility] OR                                                     Pathologist:           Vignesh Leone MD                                                          Specimens:   1) - Small Intestine, Duodenum, SMALL BOWEL BX R/O CELIAC                                           2) - Gastric, Body, RANDOM GASTRIC BX                                                                3) - Esophagus, Distal, DISTAL ESOPHAGUS BX                                                         4) - Esophagus, PROXIMAL ESOPHAGUS BX                                                      FINALDX  06/29/2023     1. Small Bowel, Duodenum, Biopsy: Benign small bowel mucosa with   A.  Normal intact villous surface.   B.  No significant inflammation, no granulomas.   C.  No viral inclusions or other organisms on routinely stained sections.    2. Stomach, Biopsy: Oxyntic type gastric mucosa with   A. No significant histopathology.   B. No H. pylori-like organisms identified.    3. Esophagus, Distal, Biopsy: Squamous mucosa with    A. No significant inflammation/eosinophilia.   B. No goblet cell metaplasia, dysplasia nor malignancy.   C. No viral inclusions nor fungal organisms identified.    4. Esophagus, Proximal, Biopsy: Squamous mucosa with    A. No significant inflammation/eosinophilia.   B. No goblet cell metaplasia, dysplasia nor malignancy.   C. No viral inclusions nor fungal organisms identified.    mec/pkm        Lab Results   Component Value Date    HGB 15.6 01/21/2023    MCV 89.5 01/21/2023     01/21/2023    ALT 51 01/21/2023    AST 83 (H) 01/21/2023      No results found.    REVIEW OF SYSTEMS  Review of Systems      ACTIVE PROBLEMS  Patient Active Problem List    Diagnosis     Gastroesophageal reflux disease without esophagitis [K21.9]     Chronic right shoulder pain [M25.511, G89.29]     Calculus of gallbladder with acute cholecystitis without obstruction [K80.00]     Epigastric pain [R10.13]          PAST MEDICAL HISTORY  Past Medical History:   Diagnosis Date    Cholelithiasis 2021    Gallbladder removed    GERD (gastroesophageal reflux disease) 2015    Controlled with medication    Periarthritis of shoulder 1/1/2021    Ulcer of abdomen wall     Ulcer of esophagus          SURGICAL HISTORY  Past Surgical History:   Procedure Laterality Date    ADENOIDECTOMY      CHOLECYSTECTOMY       CHOLECYSTECTOMY WITH INTRAOPERATIVE CHOLANGIOGRAM N/A 07/30/2021    Procedure: CHOLECYSTECTOMY LAPAROSCOPIC INTRAOPERATIVE CHOLANGIOGRAM;  Surgeon: Brittanie Sequeira MD;  Location: [facility];  Service: General;  Laterality: N/A;    COLONOSCOPY      ENDOSCOPY N/A 06/18/2021    Procedure: ESOPHAGOGASTRODUODENOSCOPY;  Surgeon: Brittanie Sequeira MD;  Location: [facility];  Service: Gastroenterology;  Laterality: N/A;    ENDOSCOPY N/A 06/29/2023    Procedure: ESOPHAGOGASTRODUODENOSCOPY WITH BIOPSY;  Surgeon: Adalberto Bernard MD;  Location: [facility];  Service: Gastroenterology;  Laterality: N/A;  NORMAL    TONSILLECTOMY      UPPER GASTROINTESTINAL ENDOSCOPY      WISDOM TOOTH EXTRACTION           FAMILY HISTORY  Family History   Problem Relation Age of Onset    Irritable bowel syndrome Mother     Colon polyps Father     Melanoma Father     No Known Problems Brother     Heart disease Maternal Grandmother     Hypertension Maternal Grandmother     Breast cancer Paternal Grandmother     No Known Problems Daughter     Asthma Son     Clotting disorder Brother         Factor V    Colon cancer Neg Hx     Crohn's disease Neg Hx     Ulcerative colitis Neg Hx          SOCIAL HISTORY  Social History     Occupational History    Not on file   Tobacco Use    Smoking status: Never     Passive exposure: Never    Smokeless tobacco: Never   Vaping Use    Vaping status: Never Used   Substance and Sexual Activity    Alcohol use: Yes     Alcohol/week: 7.0 standard drinks of alcohol     Types: 7 Cans of beer per week    Drug use: Never    Sexual activity: Yes     Partners: Female     Birth control/protection: Vasectomy         CURRENT MEDICATIONS    Current Outpatient Medications:     hyoscyamine (LEVSIN) 0.125 MG SL tablet, DISSOLVE 1 TABLET IN MOUTH EVERY 4 HOURS, Disp: , Rfl:     pantoprazole (PROTONIX) 40 MG EC tablet, Take 1 tablet by mouth Daily., Disp: 90 tablet, Rfl: 3    propranolol (INDERAL) 40 MG tablet, Take 0.5-1  "tablets by mouth As Needed., Disp: , Rfl:     Hydrocortisone, Perianal, (ANUSOL-HC) 2.5 % rectal cream, Insert  into the rectum 2 (Two) Times a Day As Needed for Hemorrhoids., Disp: 30 g, Rfl: 0    Plecanatide (Trulance) 3 MG tablet, Take 1 tablet by mouth Daily., Disp: 30 tablet, Rfl: 11    ALLERGIES  Patient has no known allergies.    VITALS  Vitals:    03/06/25 0821   BP: 122/82   BP Location: Left arm   Patient Position: Sitting   Cuff Size: Adult   Weight: 77.6 kg (171 lb)   Height: 172.7 cm (68\")       PHYSICAL EXAM  Debilities/Disabilities Identified: None  Emotional Behavior: Appropriate  Wt Readings from Last 3 Encounters:   03/06/25 77.6 kg (171 lb)   11/29/23 79.9 kg (176 lb 1.6 oz)   07/23/23 79.4 kg (175 lb)     Ht Readings from Last 1 Encounters:   03/06/25 172.7 cm (68\")     Body mass index is 26 kg/m².  Physical Exam  Constitutional:       General: He is not in acute distress.     Appearance: Normal appearance. He is not ill-appearing.   HENT:      Head: Normocephalic and atraumatic.      Mouth/Throat:      Mouth: Mucous membranes are moist.      Pharynx: No posterior oropharyngeal erythema.   Eyes:      General: No scleral icterus.  Cardiovascular:      Rate and Rhythm: Normal rate and regular rhythm.      Heart sounds: Normal heart sounds.   Pulmonary:      Effort: Pulmonary effort is normal.      Breath sounds: Normal breath sounds.   Abdominal:      General: Abdomen is flat. Bowel sounds are normal. There is no distension.      Palpations: Abdomen is soft. There is no mass.      Tenderness: There is abdominal tenderness in the right lower quadrant and left lower quadrant. There is no guarding or rebound. Negative signs include Dya's sign.      Hernia: No hernia is present.   Musculoskeletal:      Cervical back: Neck supple.   Skin:     General: Skin is warm.      Capillary Refill: Capillary refill takes less than 2 seconds.   Neurological:      General: No focal deficit present.      Mental " Status: He is alert and oriented to person, place, and time.   Psychiatric:         Mood and Affect: Mood normal.         Behavior: Behavior normal.         Thought Content: Thought content normal.         Judgment: Judgment normal.         CLINICAL DATA REVIEWED   reviewed previous lab results and integrated with today's visit, reviewed notes from other physicians and/or last GI encounter, reviewed previous endoscopy results and available photos, reviewed surgical pathology results from previous biopsies    ASSESSMENT  Diagnoses and all orders for this visit:    Irritable bowel syndrome with constipation    Gastroesophageal reflux disease without esophagitis  -     pantoprazole (PROTONIX) 40 MG EC tablet; Take 1 tablet by mouth Daily.    Other orders  -     propranolol (INDERAL) 40 MG tablet; Take 0.5-1 tablets by mouth As Needed.  -     Plecanatide (Trulance) 3 MG tablet; Take 1 tablet by mouth Daily.  -     Hydrocortisone, Perianal, (ANUSOL-HC) 2.5 % rectal cream; Insert  into the rectum 2 (Two) Times a Day As Needed for Hemorrhoids.          PLAN    GERD: Continue current management with 40 mg pantoprazole daily  IBS-C: Will start trulance daily, continue supportive measures    Return in about 3 months (around 6/6/2025).    I have discussed the above plan with the patient.  They verbalize understanding and are in agreement with the plan.  They have been advised to contact the office for any questions, concerns, or changes related to their health.

## 2025-03-06 NOTE — TELEPHONE ENCOUNTER
Approved today by [employer] 2017  Your request has been approved  Effective Date: 3/5/2025  Authorization Expiration Date: 3/5/2026

## 2025-03-10 RX ORDER — PLECANATIDE 3 MG/1
1 TABLET ORAL DAILY
Qty: 30 TABLET | Refills: 11 | Status: SHIPPED | OUTPATIENT
Start: 2025-03-10 | End: 2025-03-12

## 2025-03-19 ENCOUNTER — TELEPHONE (OUTPATIENT)
Dept: GASTROENTEROLOGY | Facility: CLINIC | Age: 35
End: 2025-03-19
Payer: COMMERCIAL

## 2025-03-19 NOTE — TELEPHONE ENCOUNTER
Pt spouse called needed PA for [employer]  Sent PA yesterday through [facility]    Approved on March 18 by [employer] 2017  Your request has been approved  Effective Date: 3/18/2025

## 2025-07-31 RX ORDER — PLECANATIDE 3 MG/1
1 TABLET ORAL DAILY
Qty: 30 TABLET | Refills: 11 | Status: SHIPPED | OUTPATIENT
Start: 2025-07-31

## (undated) DEVICE — Device

## (undated) DEVICE — BITEBLOCK OMNI BLOC

## (undated) DEVICE — 2, DISPOSABLE SUCTION/IRRIGATOR WITH DISPOSABLE TIP: Brand: STRYKEFLOW

## (undated) DEVICE — ADHS SKIN DERMABOND TOP ADVANCED

## (undated) DEVICE — KT ORCA ORCAPOD DISP STRL

## (undated) DEVICE — ENDOPATH XCEL BLADELESS TROCARS WITH STABILITY SLEEVES: Brand: ENDOPATH XCEL

## (undated) DEVICE — LAP CHOLE PACK-LF: Brand: MEDLINE INDUSTRIES, INC.

## (undated) DEVICE — DISPOSABLE MONOPOLAR ENDOSCOPIC CORD 10 FT. (3M): Brand: KIRWAN

## (undated) DEVICE — ENDOPATH XCEL UNIVERSAL TROCAR STABLILITY SLEEVES: Brand: ENDOPATH XCEL

## (undated) DEVICE — ENDOPATH XCEL WITH OPTIVIEW TECHNOLOGY UNIVERSAL TROCAR STABILITY SLEEVES: Brand: ENDOPATH XCEL OPTIVIEW

## (undated) DEVICE — FLEX ADVANTAGE 1500CC: Brand: FLEX ADVANTAGE

## (undated) DEVICE — SUT VIC 0 TN 27IN DYED JTN0G

## (undated) DEVICE — SUT VIC PLSTC UD PS2 4/0 27IN J426

## (undated) DEVICE — VIAL FORMLN CAP 10PCT 20ML

## (undated) DEVICE — MSK ENDO PORT O2 POM ELITE CURAPLEX A/

## (undated) DEVICE — GOWN,AURORA,NONREINFORCED,LARGE: Brand: MEDLINE

## (undated) DEVICE — ENDOPATH PNEUMONEEDLE INSUFFLATION NEEDLES WITH LUER LOCK CONNECTORS 120MM: Brand: ENDOPATH

## (undated) DEVICE — SINGLE-USE BIOPSY FORCEPS: Brand: RADIAL JAW 4

## (undated) DEVICE — GLV SURG BIOGEL LTX PF 6 1/2

## (undated) DEVICE — DECANT BG O JET

## (undated) DEVICE — APPL CHLORAPREP HI/LITE 26ML ORNG

## (undated) DEVICE — ENDOPATH XCEL WITH OPTIVIEW TECHNOLOGY BLADELESS TROCARS WITH STABILITY SLEEVES: Brand: ENDOPATH XCEL OPTIVIEW

## (undated) DEVICE — 9165 UNIVERSAL PATIENT PLATE: Brand: 3M™